# Patient Record
Sex: FEMALE | Race: WHITE | Employment: OTHER | URBAN - METROPOLITAN AREA
[De-identification: names, ages, dates, MRNs, and addresses within clinical notes are randomized per-mention and may not be internally consistent; named-entity substitution may affect disease eponyms.]

---

## 2017-01-19 ENCOUNTER — HOSPITAL ENCOUNTER (OUTPATIENT)
Dept: MAMMOGRAPHY | Age: 77
Discharge: HOME | End: 2017-01-19
Attending: INTERNAL MEDICINE
Payer: MEDICARE

## 2017-01-19 DIAGNOSIS — Z12.31 VISIT FOR SCREENING MAMMOGRAM: ICD-10-CM

## 2017-01-19 PROCEDURE — 77067 SCR MAMMO BI INCL CAD: CPT

## 2017-04-03 ENCOUNTER — HOSPITAL ENCOUNTER (OUTPATIENT)
Dept: LAB | Age: 77
Discharge: HOME | End: 2017-04-03
Payer: MEDICARE

## 2017-04-03 DIAGNOSIS — N27.0 UNILATERAL SMALL KIDNEY: ICD-10-CM

## 2017-04-03 DIAGNOSIS — E21.1 HYPERPARATHYROIDISM DUE TO 1,25(0H)2D3 (HCC): ICD-10-CM

## 2017-04-03 DIAGNOSIS — N18.4 CHRONIC KIDNEY DISEASE, STAGE IV (SEVERE) (HCC): ICD-10-CM

## 2017-04-03 DIAGNOSIS — N28.89 VASCULAR DISORDERS OF KIDNEY: ICD-10-CM

## 2017-04-03 DIAGNOSIS — I10 ESSENTIAL HYPERTENSION, MALIGNANT: ICD-10-CM

## 2017-04-03 DIAGNOSIS — E78.5 OTHER AND UNSPECIFIED HYPERLIPIDEMIA: ICD-10-CM

## 2017-04-03 LAB
ALBUMIN SERPL BCP-MCNC: 4.4 G/DL (ref 3.4–5)
ANION GAP BLD CALC-SCNC: 8 MMOL/L (ref 3–18)
BUN SERPL-MCNC: 20 MG/DL (ref 7–18)
BUN/CREAT SERPL: 12 (ref 12–20)
CALCIUM SERPL-MCNC: 10 MG/DL (ref 8.5–10.1)
CALCIUM SERPL-MCNC: 9.8 MG/DL (ref 8.5–10.1)
CHLORIDE SERPL-SCNC: 106 MMOL/L (ref 100–108)
CO2 SERPL-SCNC: 27 MMOL/L (ref 21–32)
CREAT SERPL-MCNC: 1.73 MG/DL (ref 0.6–1.3)
CREAT UR-MCNC: 152 MG/DL (ref 30–125)
GLUCOSE SERPL-MCNC: 96 MG/DL (ref 74–99)
MICROALBUMIN UR-MCNC: 9.27 MG/DL (ref 0–3)
MICROALBUMIN/CREAT UR-RTO: 61 MG/G (ref 0–30)
PHOSPHATE SERPL-MCNC: 3.5 MG/DL (ref 2.5–4.9)
POTASSIUM SERPL-SCNC: 5 MMOL/L (ref 3.5–5.5)
PTH-INTACT SERPL-MCNC: 103 PG/ML (ref 14–72)
SODIUM SERPL-SCNC: 141 MMOL/L (ref 136–145)

## 2017-04-03 PROCEDURE — 83970 ASSAY OF PARATHORMONE: CPT | Performed by: INTERNAL MEDICINE

## 2017-04-03 PROCEDURE — 80069 RENAL FUNCTION PANEL: CPT | Performed by: INTERNAL MEDICINE

## 2017-04-03 PROCEDURE — 36415 COLL VENOUS BLD VENIPUNCTURE: CPT | Performed by: INTERNAL MEDICINE

## 2017-04-03 PROCEDURE — 82043 UR ALBUMIN QUANTITATIVE: CPT | Performed by: INTERNAL MEDICINE

## 2017-04-17 ENCOUNTER — HOSPITAL ENCOUNTER (OUTPATIENT)
Dept: CT IMAGING | Age: 77
Discharge: HOME | End: 2017-04-17
Attending: UROLOGY
Payer: MEDICARE

## 2017-04-17 ENCOUNTER — HOSPITAL ENCOUNTER (OUTPATIENT)
Dept: LAB | Age: 77
Discharge: HOME | End: 2017-04-17
Payer: MEDICARE

## 2017-04-17 ENCOUNTER — HOSPITAL ENCOUNTER (OUTPATIENT)
Dept: GENERAL RADIOLOGY | Age: 77
Discharge: HOME | End: 2017-04-17
Attending: UROLOGY
Payer: MEDICARE

## 2017-04-17 DIAGNOSIS — N28.89 RENAL MASS: ICD-10-CM

## 2017-04-17 DIAGNOSIS — C64.1 CANCER OF KIDNEY, RIGHT (HCC): ICD-10-CM

## 2017-04-17 DIAGNOSIS — N26.1 RIGHT RENAL ATROPHY: ICD-10-CM

## 2017-04-17 LAB
ANION GAP BLD CALC-SCNC: 14 MMOL/L (ref 3–18)
BUN SERPL-MCNC: 23 MG/DL (ref 7–18)
BUN/CREAT SERPL: 13 (ref 12–20)
CALCIUM SERPL-MCNC: 9.5 MG/DL (ref 8.5–10.1)
CHLORIDE SERPL-SCNC: 106 MMOL/L (ref 100–108)
CO2 SERPL-SCNC: 25 MMOL/L (ref 21–32)
CREAT SERPL-MCNC: 1.84 MG/DL (ref 0.6–1.3)
ERYTHROCYTE [DISTWIDTH] IN BLOOD BY AUTOMATED COUNT: 12.5 % (ref 11.6–14.5)
GLUCOSE SERPL-MCNC: 98 MG/DL (ref 74–99)
HCT VFR BLD AUTO: 36.3 % (ref 35–45)
HGB BLD-MCNC: 11.7 G/DL (ref 12–16)
MCH RBC QN AUTO: 33.1 PG (ref 24–34)
MCHC RBC AUTO-ENTMCNC: 32.2 G/DL (ref 31–37)
MCV RBC AUTO: 102.8 FL (ref 74–97)
PLATELET # BLD AUTO: 193 K/UL (ref 135–420)
PMV BLD AUTO: 11.4 FL (ref 9.2–11.8)
POTASSIUM SERPL-SCNC: 4.7 MMOL/L (ref 3.5–5.5)
RBC # BLD AUTO: 3.53 M/UL (ref 4.2–5.3)
SODIUM SERPL-SCNC: 145 MMOL/L (ref 136–145)
WBC # BLD AUTO: 6.9 K/UL (ref 4.6–13.2)

## 2017-04-17 PROCEDURE — 71020 XR CHEST PA LAT: CPT

## 2017-04-17 PROCEDURE — 74176 CT ABD & PELVIS W/O CONTRAST: CPT

## 2018-02-22 ENCOUNTER — HOSPITAL ENCOUNTER (OUTPATIENT)
Dept: MAMMOGRAPHY | Age: 78
Discharge: HOME | End: 2018-02-22
Attending: INTERNAL MEDICINE
Payer: MEDICARE

## 2018-02-22 DIAGNOSIS — Z12.31 VISIT FOR SCREENING MAMMOGRAM: ICD-10-CM

## 2018-02-22 PROCEDURE — 77063 BREAST TOMOSYNTHESIS BI: CPT

## 2018-03-07 ENCOUNTER — APPOINTMENT (OUTPATIENT)
Dept: GENERAL RADIOLOGY | Age: 78
DRG: 312 | End: 2018-03-07
Attending: PHYSICIAN ASSISTANT
Payer: MEDICARE

## 2018-03-07 ENCOUNTER — HOSPITAL ENCOUNTER (INPATIENT)
Age: 78
LOS: 1 days | Discharge: HOME HEALTH | DRG: 312 | End: 2018-03-09
Attending: EMERGENCY MEDICINE | Admitting: INTERNAL MEDICINE
Payer: MEDICARE

## 2018-03-07 ENCOUNTER — APPOINTMENT (OUTPATIENT)
Dept: CT IMAGING | Age: 78
DRG: 312 | End: 2018-03-07
Attending: PHYSICIAN ASSISTANT
Payer: MEDICARE

## 2018-03-07 DIAGNOSIS — S00.03XA CONTUSION OF SCALP, INITIAL ENCOUNTER: ICD-10-CM
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
## 2018-03-07 DIAGNOSIS — Z23 IMMUNIZATION, TETANUS-DIPHTHERIA: ICD-10-CM

## 2018-03-07 DIAGNOSIS — R00.1 BRADYCARDIA WITH 41-50 BEATS PER MINUTE: ICD-10-CM

## 2018-03-07 DIAGNOSIS — R55 SYNCOPE AND COLLAPSE: Primary | ICD-10-CM

## 2018-03-07 DIAGNOSIS — S41.112A SKIN TEAR OF LEFT UPPER ARM WITHOUT COMPLICATION, INITIAL ENCOUNTER: ICD-10-CM

## 2018-03-07 DIAGNOSIS — S01.01XA LACERATION OF SCALP, INITIAL ENCOUNTER: ICD-10-CM

## 2018-03-07 PROBLEM — R10.30 GROIN PAIN: Status: ACTIVE | Noted: 2018-03-07

## 2018-03-07 PROBLEM — M79.7 FIBROMYALGIA: Status: ACTIVE | Noted: 2018-03-07

## 2018-03-07 PROBLEM — M19.90 ARTHRITIS: Status: ACTIVE | Noted: 2018-03-07

## 2018-03-07 PROBLEM — C80.1 CANCER (HCC): Status: ACTIVE | Noted: 2018-03-07

## 2018-03-07 PROBLEM — R11.2 NAUSEA & VOMITING: Status: ACTIVE | Noted: 2018-03-07

## 2018-03-07 PROBLEM — M70.60 BURSITIS, TROCHANTERIC: Status: ACTIVE | Noted: 2018-03-07

## 2018-03-07 PROBLEM — M54.9 BACK PAIN: Status: ACTIVE | Noted: 2018-03-07

## 2018-03-07 PROBLEM — M53.3 SI (SACROILIAC) JOINT DYSFUNCTION: Status: ACTIVE | Noted: 2018-03-07

## 2018-03-07 PROBLEM — E78.5 HYPERLIPEMIA: Status: ACTIVE | Noted: 2018-03-07

## 2018-03-07 LAB
ALBUMIN SERPL-MCNC: 4 G/DL (ref 3.4–5)
ALBUMIN/GLOB SERPL: 1.2 {RATIO} (ref 0.8–1.7)
ALP SERPL-CCNC: 103 U/L (ref 45–117)
ALT SERPL-CCNC: 19 U/L (ref 13–56)
ANION GAP SERPL CALC-SCNC: 8 MMOL/L (ref 3–18)
AST SERPL-CCNC: 26 U/L (ref 15–37)
ATRIAL RATE: 46 BPM
BASOPHILS # BLD: 0 K/UL (ref 0–0.1)
BASOPHILS NFR BLD: 0 % (ref 0–2)
BILIRUB SERPL-MCNC: 0.5 MG/DL (ref 0.2–1)
BUN SERPL-MCNC: 18 MG/DL (ref 7–18)
BUN/CREAT SERPL: 11 (ref 12–20)
CALCIUM SERPL-MCNC: 9.6 MG/DL (ref 8.5–10.1)
CALCULATED P AXIS, ECG09: 50 DEGREES
CALCULATED R AXIS, ECG10: 27 DEGREES
CALCULATED T AXIS, ECG11: 58 DEGREES
CHLORIDE SERPL-SCNC: 108 MMOL/L (ref 100–108)
CHOLEST SERPL-MCNC: 132 MG/DL
CK MB CFR SERPL CALC: 1.4 % (ref 0–4)
CK MB CFR SERPL CALC: 1.6 % (ref 0–4)
CK MB SERPL-MCNC: 1.6 NG/ML (ref 5–25)
CK MB SERPL-MCNC: 2.2 NG/ML (ref 5–25)
CK SERPL-CCNC: 112 U/L (ref 26–192)
CK SERPL-CCNC: 136 U/L (ref 26–192)
CO2 SERPL-SCNC: 24 MMOL/L (ref 21–32)
CREAT SERPL-MCNC: 1.62 MG/DL (ref 0.6–1.3)
DIAGNOSIS, 93000: NORMAL
DIFFERENTIAL METHOD BLD: ABNORMAL
EOSINOPHIL # BLD: 0.1 K/UL (ref 0–0.4)
EOSINOPHIL NFR BLD: 1 % (ref 0–5)
ERYTHROCYTE [DISTWIDTH] IN BLOOD BY AUTOMATED COUNT: 12.6 % (ref 11.6–14.5)
GLOBULIN SER CALC-MCNC: 3.4 G/DL (ref 2–4)
GLUCOSE SERPL-MCNC: 114 MG/DL (ref 74–99)
HCT VFR BLD AUTO: 36.6 % (ref 35–45)
HDLC SERPL-MCNC: 55 MG/DL (ref 40–60)
HDLC SERPL: 2.4 {RATIO} (ref 0–5)
HGB BLD-MCNC: 12 G/DL (ref 12–16)
LDLC SERPL CALC-MCNC: 62.6 MG/DL (ref 0–100)
LIPID PROFILE,FLP: NORMAL
LYMPHOCYTES # BLD: 1.6 K/UL (ref 0.9–3.6)
LYMPHOCYTES NFR BLD: 18 % (ref 21–52)
MCH RBC QN AUTO: 33 PG (ref 24–34)
MCHC RBC AUTO-ENTMCNC: 32.8 G/DL (ref 31–37)
MCV RBC AUTO: 100.5 FL (ref 74–97)
MONOCYTES # BLD: 0.5 K/UL (ref 0.05–1.2)
MONOCYTES NFR BLD: 5 % (ref 3–10)
NEUTS SEG # BLD: 6.5 K/UL (ref 1.8–8)
NEUTS SEG NFR BLD: 76 % (ref 40–73)
P-R INTERVAL, ECG05: 142 MS
PLATELET # BLD AUTO: 186 K/UL (ref 135–420)
PMV BLD AUTO: 10.7 FL (ref 9.2–11.8)
POTASSIUM SERPL-SCNC: 4.8 MMOL/L (ref 3.5–5.5)
PROT SERPL-MCNC: 7.4 G/DL (ref 6.4–8.2)
Q-T INTERVAL, ECG07: 444 MS
QRS DURATION, ECG06: 94 MS
QTC CALCULATION (BEZET), ECG08: 388 MS
RBC # BLD AUTO: 3.64 M/UL (ref 4.2–5.3)
SODIUM SERPL-SCNC: 140 MMOL/L (ref 136–145)
TRIGL SERPL-MCNC: 72 MG/DL (ref ?–150)
TROPONIN I SERPL-MCNC: <0.02 NG/ML (ref 0–0.04)
TROPONIN I SERPL-MCNC: <0.02 NG/ML (ref 0–0.04)
VENTRICULAR RATE, ECG03: 46 BPM
VLDLC SERPL CALC-MCNC: 14.4 MG/DL
WBC # BLD AUTO: 8.6 K/UL (ref 4.6–13.2)

## 2018-03-07 PROCEDURE — 77030008460 HC STPLR SKN PRECIS 3M -A

## 2018-03-07 PROCEDURE — 93306 TTE W/DOPPLER COMPLETE: CPT

## 2018-03-07 PROCEDURE — 74011250637 HC RX REV CODE- 250/637: Performed by: NURSE PRACTITIONER

## 2018-03-07 PROCEDURE — 90715 TDAP VACCINE 7 YRS/> IM: CPT | Performed by: PHYSICIAN ASSISTANT

## 2018-03-07 PROCEDURE — 80061 LIPID PANEL: CPT | Performed by: NURSE PRACTITIONER

## 2018-03-07 PROCEDURE — 74011250637 HC RX REV CODE- 250/637: Performed by: INTERNAL MEDICINE

## 2018-03-07 PROCEDURE — 75810000293 HC SIMP/SUPERF WND  RPR

## 2018-03-07 PROCEDURE — 0HQ0XZZ REPAIR SCALP SKIN, EXTERNAL APPROACH: ICD-10-PCS | Performed by: PHYSICIAN ASSISTANT

## 2018-03-07 PROCEDURE — 70450 CT HEAD/BRAIN W/O DYE: CPT

## 2018-03-07 PROCEDURE — 74011250637 HC RX REV CODE- 250/637: Performed by: PHYSICIAN ASSISTANT

## 2018-03-07 PROCEDURE — 96372 THER/PROPH/DIAG INJ SC/IM: CPT

## 2018-03-07 PROCEDURE — 99285 EMERGENCY DEPT VISIT HI MDM: CPT

## 2018-03-07 PROCEDURE — 74011250636 HC RX REV CODE- 250/636: Performed by: INTERNAL MEDICINE

## 2018-03-07 PROCEDURE — 85025 COMPLETE CBC W/AUTO DIFF WBC: CPT | Performed by: EMERGENCY MEDICINE

## 2018-03-07 PROCEDURE — 90471 IMMUNIZATION ADMIN: CPT

## 2018-03-07 PROCEDURE — 77030038269 HC DRN EXT URIN PURWCK BARD -A

## 2018-03-07 PROCEDURE — 93005 ELECTROCARDIOGRAM TRACING: CPT

## 2018-03-07 PROCEDURE — 96360 HYDRATION IV INFUSION INIT: CPT

## 2018-03-07 PROCEDURE — 96361 HYDRATE IV INFUSION ADD-ON: CPT

## 2018-03-07 PROCEDURE — 99218 HC RM OBSERVATION: CPT

## 2018-03-07 PROCEDURE — 80053 COMPREHEN METABOLIC PANEL: CPT | Performed by: EMERGENCY MEDICINE

## 2018-03-07 PROCEDURE — 82550 ASSAY OF CK (CPK): CPT | Performed by: PHYSICIAN ASSISTANT

## 2018-03-07 PROCEDURE — 65390000012 HC CONDITION CODE 44 OBSERVATION

## 2018-03-07 PROCEDURE — 74011250636 HC RX REV CODE- 250/636: Performed by: NURSE PRACTITIONER

## 2018-03-07 PROCEDURE — 77030018836 HC SOL IRR NACL ICUM -A

## 2018-03-07 PROCEDURE — 71045 X-RAY EXAM CHEST 1 VIEW: CPT

## 2018-03-07 PROCEDURE — 74011250636 HC RX REV CODE- 250/636: Performed by: PHYSICIAN ASSISTANT

## 2018-03-07 RX ORDER — ENOXAPARIN SODIUM 100 MG/ML
40 INJECTION SUBCUTANEOUS EVERY 24 HOURS
Status: DISCONTINUED | OUTPATIENT
Start: 2018-03-07 | End: 2018-03-07

## 2018-03-07 RX ORDER — ENOXAPARIN SODIUM 100 MG/ML
30 INJECTION SUBCUTANEOUS EVERY 24 HOURS
Status: DISCONTINUED | OUTPATIENT
Start: 2018-03-07 | End: 2018-03-09 | Stop reason: HOSPADM

## 2018-03-07 RX ORDER — SIMVASTATIN 20 MG/1
20 TABLET, FILM COATED ORAL
Status: DISCONTINUED | OUTPATIENT
Start: 2018-03-07 | End: 2018-03-09 | Stop reason: HOSPADM

## 2018-03-07 RX ORDER — SODIUM CHLORIDE 0.9 % (FLUSH) 0.9 %
5-10 SYRINGE (ML) INJECTION AS NEEDED
Status: DISCONTINUED | OUTPATIENT
Start: 2018-03-07 | End: 2018-03-09 | Stop reason: HOSPADM

## 2018-03-07 RX ORDER — TRAZODONE HYDROCHLORIDE 50 MG/1
50 TABLET ORAL
Status: DISCONTINUED | OUTPATIENT
Start: 2018-03-07 | End: 2018-03-09 | Stop reason: HOSPADM

## 2018-03-07 RX ORDER — ACETAMINOPHEN 325 MG/1
650 TABLET ORAL
Status: COMPLETED | OUTPATIENT
Start: 2018-03-07 | End: 2018-03-07

## 2018-03-07 RX ORDER — SODIUM CHLORIDE 9 MG/ML
75 INJECTION, SOLUTION INTRAVENOUS CONTINUOUS
Status: DISCONTINUED | OUTPATIENT
Start: 2018-03-07 | End: 2018-03-09

## 2018-03-07 RX ORDER — MELATONIN
1000 DAILY
Status: DISCONTINUED | OUTPATIENT
Start: 2018-03-08 | End: 2018-03-09 | Stop reason: HOSPADM

## 2018-03-07 RX ORDER — GUAIFENESIN 100 MG/5ML
81 LIQUID (ML) ORAL DAILY
Status: DISCONTINUED | OUTPATIENT
Start: 2018-03-08 | End: 2018-03-09 | Stop reason: HOSPADM

## 2018-03-07 RX ORDER — ACETAMINOPHEN 325 MG/1
650 TABLET ORAL
Status: DISCONTINUED | OUTPATIENT
Start: 2018-03-07 | End: 2018-03-09 | Stop reason: HOSPADM

## 2018-03-07 RX ORDER — LOVASTATIN 20 MG/1
20 TABLET ORAL
Status: DISCONTINUED | OUTPATIENT
Start: 2018-03-07 | End: 2018-03-07

## 2018-03-07 RX ORDER — AMLODIPINE BESYLATE 5 MG/1
2.5 TABLET ORAL DAILY
Status: DISCONTINUED | OUTPATIENT
Start: 2018-03-08 | End: 2018-03-09

## 2018-03-07 RX ORDER — SODIUM CHLORIDE 0.9 % (FLUSH) 0.9 %
5-10 SYRINGE (ML) INJECTION EVERY 8 HOURS
Status: DISCONTINUED | OUTPATIENT
Start: 2018-03-07 | End: 2018-03-09 | Stop reason: HOSPADM

## 2018-03-07 RX ORDER — MONTELUKAST SODIUM 10 MG/1
10 TABLET ORAL
Status: DISCONTINUED | OUTPATIENT
Start: 2018-03-07 | End: 2018-03-09 | Stop reason: HOSPADM

## 2018-03-07 RX ADMIN — SIMVASTATIN 20 MG: 20 TABLET, FILM COATED ORAL at 21:26

## 2018-03-07 RX ADMIN — ACETAMINOPHEN 650 MG: 325 TABLET ORAL at 16:14

## 2018-03-07 RX ADMIN — SODIUM CHLORIDE 75 ML/HR: 900 INJECTION, SOLUTION INTRAVENOUS at 11:56

## 2018-03-07 RX ADMIN — ACETAMINOPHEN 650 MG: 325 TABLET ORAL at 21:29

## 2018-03-07 RX ADMIN — TETANUS TOXOID, REDUCED DIPHTHERIA TOXOID AND ACELLULAR PERTUSSIS VACCINE, ADSORBED 0.5 ML: 5; 2.5; 8; 8; 2.5 SUSPENSION INTRAMUSCULAR at 10:20

## 2018-03-07 RX ADMIN — ENOXAPARIN SODIUM 30 MG: 30 INJECTION SUBCUTANEOUS at 16:14

## 2018-03-07 RX ADMIN — Medication 10 ML: at 21:26

## 2018-03-07 RX ADMIN — SODIUM CHLORIDE 1000 ML: 900 INJECTION, SOLUTION INTRAVENOUS at 11:56

## 2018-03-07 RX ADMIN — MONTELUKAST SODIUM 10 MG: 10 TABLET, COATED ORAL at 21:26

## 2018-03-07 RX ADMIN — ACETAMINOPHEN 650 MG: 325 TABLET, FILM COATED ORAL at 11:56

## 2018-03-07 RX ADMIN — TRAZODONE HYDROCHLORIDE 50 MG: 50 TABLET ORAL at 21:26

## 2018-03-07 NOTE — H&P
Hospitalist Admission History and Physical    NAME:  Hailey Coleman   :   1940   MRN:   [id number]     PCP:  Jasmyn Francis MD  Date/Time:  3/7/2018 1:48 PM  Subjective:   CHIEF COMPLAINT:  Syncope and collapse. HISTORY OF PRESENT ILLNESS:     Kelly Gomez is a 65 y.o.  female with h/o HTN came in to the hospital after syncope and collapse at home. Pt was felt to be nauseated and then got light headed and passed out at home and hit her head. She had no chest pain no sob no abd pain. She was seen in the ED and was orthostatic and hr was in the mid 40's. Cards was consulted. And plan to admit under observation.         Past Medical History:   Diagnosis Date    Arthritis     Back pain     Back problem     Bursitis, trochanteric     Cancer ([address]     left breast cancer-left mastectomy    Fibromyalgia     Groin pain     Hyperlipemia     Hypertension 2010    Nausea & vomiting     Numbness 2008    Left side    Renal mass, right 14    Sciatica     SI (sacroiliac) joint dysfunction     Small kidney     Atrophic right kidney and densely calcified left renal artery        Past Surgical History:   Procedure Laterality Date    HX BACK SURGERY      lumbar sacral area    HX CATARACT REMOVAL Right 09    w/ lens implants    HX CATARACT REMOVAL Left 09    w/ lens implants    HX GI  10/85    Hemorrhoid surgery    HX GI      colonoscopy    HX GYN      hysterectomy    HX HEENT  09    Tube in right ear    HX MASTECTOMY Left     HX NEPHRECTOMY Right 10/9/15    [facility]    HX ORTHOPAEDIC Right 2003    carpel tunnel surgery    HX ORTHOPAEDIC Left 2004    carpel tunnel surgery       Social History   Substance Use Topics    Smoking status: Never Smoker    Smokeless tobacco: Never Used      Comment: no    Alcohol use No        Family History   Problem Relation Age of Onset    Hypertension Other      parent and sibling    Stroke Other     Heart Disease Other 48     sibling    Cancer Sister      Liver    Cancer Sister      Liver    Breast Cancer Maternal Aunt     Breast Cancer Paternal Aunt         Allergies   Allergen Reactions    Darvocet A500 [Propoxyphene N-Acetaminophen] Nausea Only    Iodine Other (comments)    Orudis [Ketoprofen] Nausea Only    Other Medication Nausea Only     oridus    Vicodin [Hydrocodone-Acetaminophen] Nausea Only        Prior to Admission Medications   Prescriptions Last Dose Informant Patient Reported? Taking? Cholecalciferol, Vitamin D3, (VITAMIN D3) 1,000 unit cap Not Taking at Unknown time  Yes No   Sig: Take 2 Tabs by mouth daily. acetaminophen (TYLENOL) 325 mg tablet 3/6/2018 at pm  Yes Yes   Sig: Take  by mouth every four (4) hours as needed for Pain. amLODIPine (NORVASC) 5 mg tablet 3/6/2018 at Unknown time  Yes Yes   Sig: Take 5 mg by mouth daily. loratadine (CLARITIN) 10 mg tablet 2/7/2018 at Unknown time  Yes Yes   Sig: Take 10 mg by mouth.   lovastatin (MEVACOR) 20 mg tablet 3/6/2018 at pm Self Yes Yes   Sig: Take 20 mg by mouth nightly. Indications: takes 1/2 tab   metoprolol succinate (TOPROL-XL) 50 mg XL tablet 3/6/2018 at pm  Yes Yes   Sig: Take 50 mg by mouth two (2) times a day. montelukast (SINGULAIR) 10 mg tablet 3/6/2018 at pm  Yes Yes   Sig: Take 10 mg by mouth nightly. traZODone (DESYREL) 50 mg tablet 3/6/2018 at pm Self Yes Yes   Sig: Take 50 mg by mouth nightly. Facility-Administered Medications: None       REVIEW OF SYSTEMS:     [] Unable to obtain  ROS due to  []mental status change  []sedated   []intubated   [x]Total of 12 systems reviewed as follows:  Constitutional:  negative fever, negative chills, negative weight loss  Eyes:   negative visual changes  ENT:   negative sore throat, tongue or lip swelling  Respiratory:  negative cough, negative dyspnea  Cards:   No chest pain. GI:   Some nausea resolved.  No chest pain.  Genitourinary: negative for frequency, dysuria  Integument:  negative for rash and pruritus  Hematologic:  negative for easy bruising and gum/nose bleeding  Musculoskel: negative for myalgias,  back pain and muscle weakness  Neurological:  negative for headaches, dizziness, vertigo  Behavl/Psych:  negative for feelings of anxiety, depression       Objective:   VITALS:    Visit Vitals    /64 (BP 1 Location: Left arm, BP Patient Position: At rest)    Pulse (!) 52    Temp 98.5 °F (36.9 °C)    Resp 16    Ht 5' 3\" (1.6 m)    Wt 65.8 kg (145 lb)    SpO2 95%    Breastfeeding No    BMI 25.69 kg/m2     Temp (24hrs), Av.7 °F (36.5 °C), Min:96.9 °F (36.1 °C), Max:98.5 °F (36.9 °C)      PHYSICAL EXAM:   General:    Alert, cooperative, no distress, appears stated age. Head:   Normocephalic, without obvious abnormality, atraumatic. Eyes:   Conjunctivae clear, anicteric sclerae. Pupils are equal  Nose:  Nares normal. No drainage or sinus tenderness. Throat:    Lips, mucosa, and tongue normal.  No Thrush  Neck:  Supple, symmetrical,  no adenopathy, thyroid: non tender    no carotid bruit and no JVD. Back:    Symmetric,  No CVA tenderness. Lungs:   Clear to auscultation bilaterally. No Wheezing or Rhonchi. No rales. Chest wall:  No tenderness or deformity. No Accessory muscle use. Heart:   Regular rate and rhythm,  no murmur, rub or gallop. Abdomen:   Soft, non-tender. Not distended. Bowel sounds normal. No masses  Extremities: Extremities normal, atraumatic, No cyanosis. No edema. No clubbing  Skin:     Texture, turgor normal. No rashes or lesions. Psych:  Good insight. Not depressed. Not anxious or agitated. Neurologic: EOMs intact. No facial asymmetry. No aphasia or slurred speech. Normal   strength, Alert and oriented X 3.        LAB DATA REVIEWED:    No components found for: [place]  Recent Labs      18   0609   NA  140   K  4.8   CL  108   CO2  24   BUN  18   CREA  1.62*   GLU 114*   CA  9.6   ALB  4.0   WBC  8.6   HGB  12.0   HCT  36.6   PLT  186         IMAGING RESULTS:   []  I have personally reviewed the actual   []CXR  []CT scan    CXR:  CT :    Assessment/Plan:       Syncope and collapse (3/7/2018) due to orthostatic hypotension;  with sinus Bradycardia due to high dose BB;  Start IV fluids. Hold BB for now watch for rebound tachycardia. Check echo.   Dc in am.       ___________________________________________________  PLAN:    Risk of deterioration:  [x]Low    []Moderate  []High      Prophylaxis:  [x]Lovenox  []Coumadin  []Hep SQ  []SCDs  []H2B/PPI    Disposition:  [x]Home w/ Family   []HH PT,OT,RN   []SNF/LTC   []SAH/Rehab    Discussed Code Status:    [x]Full Code      []DNR     ___________________________________________________    Care Plan discussed with:    [x]Patient   [x]Family    []ED Care Manager  []ED Doc   [x]Specialist :    Total Time Coordinating Admission:      minutes    []Total Critical Care Time:     ___________________________________________________  Admitting Physician: Shayna Birch MD

## 2018-03-07 NOTE — CONSULTS
[facility] - Consult Note    Date of  Admission: 3/7/2018  5:46 AM     Primary Care Physician:  Catarino Dunlap MD     Plan:     1. Syncope- Possible  related to orthostatic hypotension- will continue monitoring for any recurrence. Monitor in telemetry for any tachy or yareli arrhthymia. Check orthostatics. Added ivf 75 cc/hr. Ordered echo to assess, lvf, rvf or any wma. 2. Bradycardia- with HR high 40's. would recommend to hold bb for now. Avoid CCB, digoxin or clonidine. No need at this time for PPM  3. Hypertension- elevated reduce Norvasc to 2.5 mg daily . Ordered compression stockings   4. Heart murmur- follow echo report   5. Hx breast cancer- on remission  6. Hx of Colon Cancer- on remission        Assessment:     Hospital Problems  Date Reviewed: 5/24/2017          Codes Class Noted POA    Arthritis ICD-10-CM: M19.90  ICD-9-CM: 716.90  3/7/2018 Unknown        Back pain ICD-10-CM: M54.9  ICD-9-CM: 724.5  3/7/2018 Unknown        Cancer ([facility] [address] ICD-10-CM: C80.1  ICD-9-CM: 199.1  3/7/2018 Unknown    Overview Signed 3/7/2018 10:16 AM by Edy Elam NP     left breast cancer-left mastectomy             SI (sacroiliac) joint dysfunction ICD-10-CM: M53.3  ICD-9-CM: 724.6  3/7/2018 Unknown        Bursitis, trochanteric ICD-10-CM: M70.60  ICD-9-CM: 726.5  3/7/2018 Unknown        Hyperlipemia ICD-10-CM: E78.5  ICD-9-CM: 272.4  3/7/2018 Unknown        Fibromyalgia ICD-10-CM: M79.7  ICD-9-CM: 729.1  3/7/2018 Unknown        Nausea & vomiting ICD-10-CM: R11.2  ICD-9-CM: 787.01  3/7/2018 Unknown        Groin pain ICD-10-CM: R10.30  ICD-9-CM: 789.09  3/7/2018 Unknown                   History of Present Illness: This is a 65 y.o. female admitted for syncope. Patient with PMHx of hypertension, hyperlipidemia, colon cancer, breast cancer and fibromyalgia.  Patient presented to the ED c/o syncopal episode patient reports that approx 0230 she  fell in bathroom and went straight down and lost consciousness. She reports getting out of bed nauseated. When she walked to the bathroom she passed out, she c/o mild dizziness prior her syncopal episode. She denies any previous syncopal  Episodes. She reports taking sleeping pills trazodone the night before. She denies any chest pain pressure or any palpitations   No recent CVA. Past Medical History:     Past Medical History:   Diagnosis Date    Arthritis     Back pain     Back problem     Bursitis, trochanteric     Cancer ([address]     left breast cancer-left mastectomy    Fibromyalgia     Groin pain     Hyperlipemia     Hypertension 9/13/2010    Nausea & vomiting     Numbness April 2008    Left side    Renal mass, right 6/4/14    Sciatica     SI (sacroiliac) joint dysfunction     Small kidney     Atrophic right kidney and densely calcified left renal artery         Social History:     Social History     Social History    Marital status:      Spouse name: N/A    Number of children: N/A    Years of education: N/A     Social History Main Topics    Smoking status: Never Smoker    Smokeless tobacco: Never Used      Comment: no    Alcohol use No    Drug use: No    Sexual activity: Yes     Partners: Male     Other Topics Concern    Not on file     Social History Narrative        Family History:     Family History   Problem Relation Age of Onset    Hypertension Other      parent and sibling    Stroke Other     Heart Disease Other 48     sibling    Cancer Sister      Liver    Cancer Sister      Liver    Breast Cancer Maternal Aunt     Breast Cancer Paternal Aunt         Medications:      Allergies   Allergen Reactions    Darvocet A500 [Propoxyphene N-Acetaminophen] Nausea Only    Iodine Other (comments)    Orudis [Ketoprofen] Nausea Only    Other Medication Nausea Only     oridus    Vicodin [Hydrocodone-Acetaminophen] Nausea Only        Current Facility-Administered Medications   Medication Dose Route Frequency    sodium chloride 0.9 % bolus infusion 1,000 mL  1,000 mL IntraVENous ONCE    0.9% sodium chloride infusion  75 mL/hr IntraVENous CONTINUOUS    [START ON 3/8/2018] amLODIPine (NORVASC) tablet 2.5 mg  2.5 mg Oral DAILY    simvastatin (ZOCOR) tablet 20 mg  20 mg Oral QHS    [START ON 3/8/2018] aspirin chewable tablet 81 mg  81 mg Oral DAILY     Current Outpatient Prescriptions   Medication Sig    amLODIPine (NORVASC) 5 mg tablet Take 5 mg by mouth daily.  loratadine (CLARITIN) 10 mg tablet Take 10 mg by mouth.  acetaminophen (TYLENOL) 325 mg tablet Take  by mouth every four (4) hours as needed for Pain.  montelukast (SINGULAIR) 10 mg tablet Take 10 mg by mouth nightly.  metoprolol succinate (TOPROL-XL) 50 mg XL tablet Take 50 mg by mouth two (2) times a day.  Cholecalciferol, Vitamin D3, (VITAMIN D3) 1,000 unit cap Take 2 Tabs by mouth daily.  traZODone (DESYREL) 50 mg tablet Take 50 mg by mouth nightly.  lovastatin (MEVACOR) 20 mg tablet Take 20 mg by mouth nightly.     Indications: takes 1/2 tab        Review Of Systems:       Constitutional: No fever, no chills, no weight loss, no night sweats   HEENT: No epistaxis, no nasal drainage, no difficulty in swallowing, no redness in eyes  Respiratory:  negative for cough, sputum, hemoptysis, pleurisy/chest pain, wheezing, dyspnea on exertion or emphysema  Cardiovascular: dizziness and syncope  Gastrointestinal: no abd pain, no vomiting, no diarrhea, no bleeding symptoms  Genitourinary: No urinary symptoms or hematuria  Integument/breast: No ulcers or rashes  Musculoskeletal: back pain   Neurological: No focal weakness, no seizures, no headaches  Behvioral/Psych: No anxiety, no depression       Physical Exam:     Visit Vitals    /73 (BP 1 Location: Right arm, BP Patient Position: At rest)    Pulse (!) 44    Temp 96.9 °F (36.1 °C)    Resp 16    Ht 5' 3\" (1.6 m)    Wt 65.8 kg (145 lb)    SpO2 98%    BMI 25.69 kg/m2     BP Readings from Last 3 Encounters:   03/07/18 159/73   05/24/17 118/76   05/18/16 124/86     Pulse Readings from Last 3 Encounters:   03/07/18 (!) 44   08/20/15 68   08/12/14 66     Wt Readings from Last 3 Encounters:   03/07/18 65.8 kg (145 lb)   05/24/17 63.5 kg (140 lb)   05/18/16 63.5 kg (140 lb)       General:  alert, cooperative, no distress, appears stated age  Skin: Warm and dry, acyanotic, normal color. Head: Normocephalic. Laceration noted occipital area. Eyes: Sclerae anicteric, conjunctivae without injection. Neck:  nontender, no nuchal rigidity, no masses, no stridor, no carotid bruit, no JVD  Lungs:  clear to auscultation bilaterally. Heart:  regular rate and rhythm, S1, S2 normal, systolic murmur: holosystolic 2/6, blowing at lower left sternal border, at apex, radiates to axilla, no click  Abdomen:  abdomen is soft without significant tenderness, masses, organomegaly or guarding  Extremities:  extremities normal, atraumatic, no cyanosis or edema  Neurological: grossly intact. No focal abnormalities, moves all extremities well. Psychiatric Affect: The patient is awake, alert and oriented x3. Kalina Barbone is interactive and appropriate. Data Review:     Recent Results (from the past 48 hour(s))   CBC WITH AUTOMATED DIFF    Collection Time: 03/07/18  6:09 AM   Result Value Ref Range    WBC 8.6 4.6 - 13.2 K/uL    RBC 3.64 (L) 4.20 - 5.30 M/uL    HGB 12.0 12.0 - 16.0 g/dL    HCT 36.6 35.0 - 45.0 %    .5 (H) 74.0 - 97.0 FL    MCH 33.0 24.0 - 34.0 PG    MCHC 32.8 31.0 - 37.0 g/dL    RDW 12.6 11.6 - 14.5 %    PLATELET 081 290 - 474 K/uL    MPV 10.7 9.2 - 11.8 FL    NEUTROPHILS 76 (H) 40 - 73 %    LYMPHOCYTES 18 (L) 21 - 52 %    MONOCYTES 5 3 - 10 %    EOSINOPHILS 1 0 - 5 %    BASOPHILS 0 0 - 2 %    ABS. NEUTROPHILS 6.5 1.8 - 8.0 K/UL    ABS. LYMPHOCYTES 1.6 0.9 - 3.6 K/UL    ABS. MONOCYTES 0.5 0.05 - 1.2 K/UL    ABS. EOSINOPHILS 0.1 0.0 - 0.4 K/UL    ABS.  BASOPHILS 0.0 0.0 - 0.1 K/UL    DF AUTOMATED     METABOLIC PANEL, COMPREHENSIVE    Collection Time: 03/07/18  6:09 AM   Result Value Ref Range    Sodium 140 136 - 145 mmol/L    Potassium 4.8 3.5 - 5.5 mmol/L    Chloride 108 100 - 108 mmol/L    CO2 24 21 - 32 mmol/L    Anion gap 8 3.0 - 18 mmol/L    Glucose 114 (H) 74 - 99 mg/dL    BUN 18 7.0 - 18 MG/DL    Creatinine 1.62 (H) 0.6 - 1.3 MG/DL    BUN/Creatinine ratio 11 (L) 12 - 20      GFR est AA 37 (L) >60 ml/min/1.73m2    GFR est non-AA 31 (L) >60 ml/min/1.73m2    Calcium 9.6 8.5 - 10.1 MG/DL    Bilirubin, total 0.5 0.2 - 1.0 MG/DL    ALT (SGPT) 19 13 - 56 U/L    AST (SGOT) 26 15 - 37 U/L    Alk. phosphatase 103 45 - 117 U/L    Protein, total 7.4 6.4 - 8.2 g/dL    Albumin 4.0 3.4 - 5.0 g/dL    Globulin 3.4 2.0 - 4.0 g/dL    A-G Ratio 1.2 0.8 - 1.7     CARDIAC PANEL,(CK, CKMB & TROPONIN)    Collection Time: 03/07/18  6:09 AM   Result Value Ref Range     26 - 192 U/L    CK - MB 2.2 <3.6 ng/ml    CK-MB Index 1.6 0.0 - 4.0 %    Troponin-I, Qt. <0.02 0.0 - 0.045 NG/ML   EKG, 12 LEAD, INITIAL    Collection Time: 03/07/18  6:11 AM   Result Value Ref Range    Ventricular Rate 46 BPM    Atrial Rate 46 BPM    P-R Interval 142 ms    QRS Duration 94 ms    Q-T Interval 444 ms    QTC Calculation (Bezet) 388 ms    Calculated P Axis 50 degrees    Calculated R Axis 27 degrees    Calculated T Axis 58 degrees    Diagnosis       Sinus bradycardia  RSR' or QR pattern in V1 suggests right ventricular conduction delay  Borderline ECG  When compared with ECG of 24-JUL-2014 12:14,  No significant change was found     CARDIAC PANEL,(CK, CKMB & TROPONIN)    Collection Time: 03/07/18 10:18 AM   Result Value Ref Range     26 - 192 U/L    CK - MB 1.6 <3.6 ng/ml    CK-MB Index 1.4 0.0 - 4.0 %    Troponin-I, Qt. <0.02 0.0 - 0.045 NG/ML       No intake or output data in the 24 hours ending 03/07/18 1121    Cardiographics:     ECG: Sinus bradycardia .  RSR' or QR pattern in V1 suggests right ventricular conduction delay    Echocardiogram: ordered Signed By: Teresita Degroot NP     March 7, 2018

## 2018-03-07 NOTE — PROGRESS NOTES
Complete 2D echocardiogram study was performed on patient. Report to follow. Patient went back to room with armband still in place.  [address] [facility]

## 2018-03-07 NOTE — IP AVS SNAPSHOT
[address] Patient: Alexandra Gomez MRN: [id number] Shannan Amy A check jeremiah indicates which time of day the medication should be taken. My Medications START taking these medications Instructions Each Dose to Equal  
 Morning Noon Evening Bedtime  
 aspirin 81 mg chewable tablet Start taking on:  3/10/2018 Your last dose was: Your next dose is: Take 1 Tab by mouth daily. 81 mg  
    
   
   
   
  
 hydrALAZINE 25 mg tablet Commonly known as:  APRESOLINE Your last dose was: Your next dose is: Take 1 Tab by mouth three (3) times daily. 25 mg CONTINUE taking these medications Instructions Each Dose to Equal  
 Morning Noon Evening Bedtime  
 amLODIPine 5 mg tablet Commonly known as:  Alyx Moe Your last dose was: Your next dose is: Take 5 mg by mouth daily. 5 mg CLARITIN 10 mg tablet Generic drug:  loratadine Your last dose was: Your next dose is: Take 10 mg by mouth. 10 mg  
    
   
   
   
  
 lovastatin 20 mg tablet Commonly known as:  MEVACOR Your last dose was: Your next dose is: Take 20 mg by mouth nightly. Indications: takes 1/2 tab  
 20 mg  
    
   
   
   
  
 SINGULAIR 10 mg tablet Generic drug:  montelukast  
   
Your last dose was: Your next dose is: Take 10 mg by mouth nightly. 10 mg  
    
   
   
   
  
 traZODone 50 mg tablet Commonly known as:  Vinny Ying Your last dose was: Your next dose is: Take 50 mg by mouth nightly. 50 mg  
    
   
   
   
  
 TYLENOL 325 mg tablet Generic drug:  acetaminophen Your last dose was: Your next dose is: Take  by mouth every four (4) hours as needed for Pain. VITAMIN D3 1,000 unit Cap Generic drug:  cholecalciferol Your last dose was: Your next dose is: Take 2 Tabs by mouth daily. 2 Tab  
    
   
   
   
  
  
STOP taking these medications   
 metoprolol succinate 50 mg XL tablet Commonly known as:  TOPROL-XL Where to Get Your Medications Information on where to get these meds will be given to you by the nurse or doctor. ! Ask your nurse or doctor about these medications  
  aspirin 81 mg chewable tablet  
 hydrALAZINE 25 mg tablet

## 2018-03-07 NOTE — PROGRESS NOTES
met with patient, her  Genita Shone, and daughter, Anthony Egan, completed the initial Spiritual Assessment of the patient, and offered Pastoral Care, see flow sheets for interventions. Pastoral support provided. Patient spoke of her I[employer] Second jayne and that it is very important to her well being. Patient does not have any Yazidism/cultural needs that will affect patients preferences in health care. Chart reviewed. Chaplains will continue to follow and will provide pastoral care on an as needed/as requested basis. Denton De Oliveira MDiv.   Board Certified [employer] [phone]

## 2018-03-07 NOTE — CONSULTS
[facility] - Consult Note    Date of  Admission: 3/7/2018  5:46 AM     Primary Care Physician:  Yeny Garnett MD     Plan:     1. Syncope- Possible  related to orthostatic hypotension- will continue monitoring for any recurrence. Monitor in telemetry for any tachy or yareli arrhthymia. Check orthostatics. Added ivf 75 cc/hr. Ordered echo to assess, lvf, rvf or any wma. 2. Bradycardia- with HR high 40's. would recommend to hold bb for now. Avoid CCB, digoxin or clonidine. No need at this time for PPM  3. Hypertension- elevated reduce Norvasc to 2.5 mg daily . Ordered compression stockings   4. Heart murmur- follow echo report   5. Hx breast cancer- on remission  6. Hx of Colon Cancer- on remission   7. Hyperlipidemia- follow lipid panel. Continue statin. D/w pt and family in detail     Assessment:     Hospital Problems  Date Reviewed: 5/24/2017          Codes Class Noted POA    Arthritis ICD-10-CM: M19.90  ICD-9-CM: 716.90  3/7/2018 Unknown        Back pain ICD-10-CM: M54.9  ICD-9-CM: 724.5  3/7/2018 Unknown        Cancer ([facility] [address] ICD-10-CM: C80.1  ICD-9-CM: 199.1  3/7/2018 Unknown    Overview Signed 3/7/2018 10:16 AM by Krystle Armijo NP     left breast cancer-left mastectomy             SI (sacroiliac) joint dysfunction ICD-10-CM: M53.3  ICD-9-CM: 724.6  3/7/2018 Unknown        Bursitis, trochanteric ICD-10-CM: M70.60  ICD-9-CM: 726.5  3/7/2018 Unknown        Hyperlipemia ICD-10-CM: E78.5  ICD-9-CM: 272.4  3/7/2018 Unknown        Fibromyalgia ICD-10-CM: M79.7  ICD-9-CM: 729.1  3/7/2018 Unknown        Nausea & vomiting ICD-10-CM: R11.2  ICD-9-CM: 787.01  3/7/2018 Unknown        Groin pain ICD-10-CM: R10.30  ICD-9-CM: 789.09  3/7/2018 Unknown        Bradycardia ICD-10-CM: R00.1  ICD-9-CM: 427.89  3/7/2018 Unknown        Syncope ICD-10-CM: R55  ICD-9-CM: 780.2  3/7/2018 Unknown                   History of Present Illness: This is a 65 y.o. female admitted for Bradycardia.  This is a 65 y.o. female admitted for syncope. Patient with PMHx of hypertension, hyperlipidemia, colon cancer, breast cancer and fibromyalgia. Patient presented to the ED c/o syncopal episode patient reports that approx 0230 she  fell in bathroom and went straight down and lost consciousness. She reports getting out of bed nauseated. When she walked to the bathroom she passed out, she c/o mild dizziness prior her syncopal episode. She denies any previous syncopal  Episodes. She reports taking sleeping pills trazodone the night before. She denies any chest pain pressure or any palpitations   No recent CVA.        Past Medical History:     Past Medical History:   Diagnosis Date    Arthritis     Back pain     Back problem     Bursitis, trochanteric     Cancer ([facility] [address]     left breast cancer-left mastectomy    Fibromyalgia     Groin pain     Hyperlipemia     Hypertension 9/13/2010    Nausea & vomiting     Numbness April 2008    Left side    Renal mass, right 6/4/14    Sciatica     SI (sacroiliac) joint dysfunction     Small kidney     Atrophic right kidney and densely calcified left renal artery         Social History:     Social History     Social History    Marital status:      Spouse name: N/A    Number of children: N/A    Years of education: N/A     Social History Main Topics    Smoking status: Never Smoker    Smokeless tobacco: Never Used      Comment: no    Alcohol use No    Drug use: No    Sexual activity: Yes     Partners: Male     Other Topics Concern    Not on file     Social History Narrative        Family History:     Family History   Problem Relation Age of Onset    Hypertension Other      parent and sibling    Stroke Other     Heart Disease Other 48     sibling    Cancer Sister      Liver    Cancer Sister      Liver    Breast Cancer Maternal Aunt     Breast Cancer Paternal Aunt         Medications:      Allergies   Allergen Reactions    Darvocet A500 [Propoxyphene N-Acetaminophen] Nausea Only    Iodine Other (comments)    Orudis [Ketoprofen] Nausea Only    Other Medication Nausea Only     oridus    Vicodin [Hydrocodone-Acetaminophen] Nausea Only        Current Facility-Administered Medications   Medication Dose Route Frequency    sodium chloride 0.9 % bolus infusion 1,000 mL  1,000 mL IntraVENous ONCE    0.9% sodium chloride infusion  75 mL/hr IntraVENous CONTINUOUS    [START ON 3/8/2018] amLODIPine (NORVASC) tablet 2.5 mg  2.5 mg Oral DAILY    simvastatin (ZOCOR) tablet 20 mg  20 mg Oral QHS    [START ON 3/8/2018] aspirin chewable tablet 81 mg  81 mg Oral DAILY    acetaminophen (TYLENOL) tablet 650 mg  650 mg Oral NOW     Current Outpatient Prescriptions   Medication Sig    amLODIPine (NORVASC) 5 mg tablet Take 5 mg by mouth daily.  loratadine (CLARITIN) 10 mg tablet Take 10 mg by mouth.  acetaminophen (TYLENOL) 325 mg tablet Take  by mouth every four (4) hours as needed for Pain.  montelukast (SINGULAIR) 10 mg tablet Take 10 mg by mouth nightly.  metoprolol succinate (TOPROL-XL) 50 mg XL tablet Take 50 mg by mouth two (2) times a day.  Cholecalciferol, Vitamin D3, (VITAMIN D3) 1,000 unit cap Take 2 Tabs by mouth daily.  traZODone (DESYREL) 50 mg tablet Take 50 mg by mouth nightly.  lovastatin (MEVACOR) 20 mg tablet Take 20 mg by mouth nightly. Indications: takes 1/2 tab        Review Of Systems:       Constitutional: No fever, no chills, no weight loss, no night sweats   HEENT: No epistaxis, no nasal drainage, no difficulty in swallowing, no redness in eyes  Respiratory: dyspnea on exertion, negative for cough, sputum, hemoptysis, pleurisy/chest pain, wheezing, dyspnea on exertion or emphysema  Cardiovascular: syncope, dizziness. Gastrointestinal: no abd pain, no vomiting, no diarrhea, no bleeding symptoms  Genitourinary: No urinary symptoms or hematuria  Integument/breast: No ulcers or rashes  Musculoskeletal: back pain.   Neurological: No focal weakness, no seizures, no headaches  Behvioral/Psych: No anxiety, no depression     Physical Exam:     Visit Vitals    /73 (BP 1 Location: Right arm, BP Patient Position: At rest)    Pulse (!) 44    Temp 96.9 °F (36.1 °C)    Resp 16    Ht 5' 3\" (1.6 m)    Wt 65.8 kg (145 lb)    SpO2 98%    BMI 25.69 kg/m2     BP Readings from Last 3 Encounters:   03/07/18 159/73   05/24/17 118/76   05/18/16 124/86     Pulse Readings from Last 3 Encounters:   03/07/18 (!) 44   08/20/15 68   08/12/14 66     Wt Readings from Last 3 Encounters:   03/07/18 65.8 kg (145 lb)   05/24/17 63.5 kg (140 lb)   05/18/16 63.5 kg (140 lb)       General:  alert, cooperative, no distress, appears stated age  Skin: Warm and dry, acyanotic, normal color. Head: Normocephalic. Laceration noted occipital area. Eyes: Sclerae anicteric, conjunctivae without injection. Neck:  nontender, no nuchal rigidity, no masses, no stridor, no carotid bruit, no JVD  Lungs:  clear to auscultation bilaterally. Heart:  regular rate and rhythm, no S3 or S4, systolic murmur: holosystolic 3/6, blowing at lower left sternal border, at apex, no click, no rub  Abdomen:  abdomen is soft without significant tenderness, masses, organomegaly or guarding  Extremities:  extremities normal, atraumatic, no cyanosis or edema  Neurological: grossly intact. No focal abnormalities, moves all extremities well. Psychiatric Affect: The patient is awake, alert and oriented x3.  Osmin is interactive and appropriate.      Data Review:     Recent Results (from the past 48 hour(s))   CBC WITH AUTOMATED DIFF    Collection Time: 03/07/18  6:09 AM   Result Value Ref Range    WBC 8.6 4.6 - 13.2 K/uL    RBC 3.64 (L) 4.20 - 5.30 M/uL    HGB 12.0 12.0 - 16.0 g/dL    HCT 36.6 35.0 - 45.0 %    .5 (H) 74.0 - 97.0 FL    MCH 33.0 24.0 - 34.0 PG    MCHC 32.8 31.0 - 37.0 g/dL    RDW 12.6 11.6 - 14.5 %    PLATELET 337 817 - 488 K/uL    MPV 10.7 9.2 - 11.8 FL    NEUTROPHILS 76 (H) 40 - 73 %    LYMPHOCYTES 18 (L) 21 - 52 %    MONOCYTES 5 3 - 10 %    EOSINOPHILS 1 0 - 5 %    BASOPHILS 0 0 - 2 %    ABS. NEUTROPHILS 6.5 1.8 - 8.0 K/UL    ABS. LYMPHOCYTES 1.6 0.9 - 3.6 K/UL    ABS. MONOCYTES 0.5 0.05 - 1.2 K/UL    ABS. EOSINOPHILS 0.1 0.0 - 0.4 K/UL    ABS. BASOPHILS 0.0 0.0 - 0.1 K/UL    DF AUTOMATED     METABOLIC PANEL, COMPREHENSIVE    Collection Time: 03/07/18  6:09 AM   Result Value Ref Range    Sodium 140 136 - 145 mmol/L    Potassium 4.8 3.5 - 5.5 mmol/L    Chloride 108 100 - 108 mmol/L    CO2 24 21 - 32 mmol/L    Anion gap 8 3.0 - 18 mmol/L    Glucose 114 (H) 74 - 99 mg/dL    BUN 18 7.0 - 18 MG/DL    Creatinine 1.62 (H) 0.6 - 1.3 MG/DL    BUN/Creatinine ratio 11 (L) 12 - 20      GFR est AA 37 (L) >60 ml/min/1.73m2    GFR est non-AA 31 (L) >60 ml/min/1.73m2    Calcium 9.6 8.5 - 10.1 MG/DL    Bilirubin, total 0.5 0.2 - 1.0 MG/DL    ALT (SGPT) 19 13 - 56 U/L    AST (SGOT) 26 15 - 37 U/L    Alk.  phosphatase 103 45 - 117 U/L    Protein, total 7.4 6.4 - 8.2 g/dL    Albumin 4.0 3.4 - 5.0 g/dL    Globulin 3.4 2.0 - 4.0 g/dL    A-G Ratio 1.2 0.8 - 1.7     CARDIAC PANEL,(CK, CKMB & TROPONIN)    Collection Time: 03/07/18  6:09 AM   Result Value Ref Range     26 - 192 U/L    CK - MB 2.2 <3.6 ng/ml    CK-MB Index 1.6 0.0 - 4.0 %    Troponin-I, Qt. <0.02 0.0 - 0.045 NG/ML   EKG, 12 LEAD, INITIAL    Collection Time: 03/07/18  6:11 AM   Result Value Ref Range    Ventricular Rate 46 BPM    Atrial Rate 46 BPM    P-R Interval 142 ms    QRS Duration 94 ms    Q-T Interval 444 ms    QTC Calculation (Bezet) 388 ms    Calculated P Axis 50 degrees    Calculated R Axis 27 degrees    Calculated T Axis 58 degrees    Diagnosis       Sinus bradycardia  RSR' or QR pattern in V1 suggests right ventricular conduction delay  Borderline ECG  When compared with ECG of 24-JUL-2014 12:14,  No significant change was found     CARDIAC PANEL,(CK, CKMB & TROPONIN)    Collection Time: 03/07/18 10:18 AM   Result Value Ref Range  26 - 192 U/L    CK - MB 1.6 <3.6 ng/ml    CK-MB Index 1.4 0.0 - 4.0 %    Troponin-I, Qt. <0.02 0.0 - 0.045 NG/ML       No intake or output data in the 24 hours ending 03/07/18 1145    Cardiographics:     ECG: sinus bradycardia    Echocardiogram: ordered      Signed By: Emory Cotto NP     March 7, 2018      Patient seen independently  Discussed the details with NP and patient.  Please see orders & recommendations  Katie Blue MD

## 2018-03-07 NOTE — ED NOTES
TRANSFER - OUT REPORT:    Verbal report given to Chrissy Moyer RN(name) on [employer]  being transferred to (unit) for routine progression of care       Report consisted of patients Situation, Background, Assessment and   Recommendations(SBAR). Information from the following report(s) SBAR, ED Summary, Intake/Output, MAR, Recent Results and Cardiac Rhythm Sinus Ayana Albright was reviewed with the receiving nurse. Opportunity for questions and clarification was provided.       Patient transported with:   Monitor  Registered Nurse

## 2018-03-07 NOTE — ED PROVIDER NOTES
EMERGENCY DEPARTMENT HISTORY AND PHYSICAL EXAM    Date: 3/7/2018  Patient Name: Bj Treviño    History of Presenting Illness     Chief Complaint   Patient presents with    Fall    Head Injury       History Provided By: Patient    Chief Complaint: Syncope with fall, head strike  Duration:  Seconds  Timing:  Acute  Location: Head  Quality: N/A  Severity: N/A  Modifying Factors: None. Associated Symptoms: lacerations to head, left arm. Additional History (Context): Bj Treviño is a 65 y.o. female with hypertension and reports bradycardia due to Metoprolol for HTN who presents after reported syncope. Pt states around 0230 this morning she experienced 15 minutes of nausea followed by syncopal event. Did not have CP, dizziness, diaphoresis prior to syncope. States she hit her head and left arm on cabinet.  reports he heard her fall and when he found her in the bathroom she was alert and oriented. Pt states she takes trazadone at night to sleep but states she didn't go to sleep after taking the medication tonight and attributes syncopal event to ambulating while on the medication. Pt is c/o lacerations to the posterior head and left arm,  drove her to the ED because of continued bleeding of the head. On daily ASA. Pt denies any discomfort at this time. Unsure of last tetanus. Reports chronic rhinorrhea and cough due to allergies, no acute worsening of either.     PCP: Kirkwood Schirmer, MD    Current Facility-Administered Medications   Medication Dose Route Frequency Provider Last Rate Last Dose    sodium chloride 0.9 % bolus infusion 1,000 mL  1,000 mL IntraVENous ONCE Catie Montalvo PA-C        0.9% sodium chloride infusion  75 mL/hr IntraVENous CONTINUOUS Verona Suarez NP        [START ON 3/8/2018] amLODIPine (NORVASC) tablet 2.5 mg  2.5 mg Oral DAILY Verona Suarez NP        simvastatin (ZOCOR) tablet 20 mg  20 mg Oral QHS Verona Suarez NP        [START ON 3/8/2018] aspirin chewable tablet 81 mg  81 mg Oral DAILY Verona Suarez, MARGARITA        acetaminophen (TYLENOL) tablet 650 mg  650 mg Oral NOW Machelle Keller PA-C         Current Outpatient Prescriptions   Medication Sig Dispense Refill    amLODIPine (NORVASC) 5 mg tablet Take 5 mg by mouth daily.  loratadine (CLARITIN) 10 mg tablet Take 10 mg by mouth.  acetaminophen (TYLENOL) 325 mg tablet Take  by mouth every four (4) hours as needed for Pain.  montelukast (SINGULAIR) 10 mg tablet Take 10 mg by mouth nightly.  metoprolol succinate (TOPROL-XL) 50 mg XL tablet Take 50 mg by mouth two (2) times a day.  Cholecalciferol, Vitamin D3, (VITAMIN D3) 1,000 unit cap Take 2 Tabs by mouth daily.  traZODone (DESYREL) 50 mg tablet Take 50 mg by mouth nightly.  lovastatin (MEVACOR) 20 mg tablet Take 20 mg by mouth nightly.     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 Migdalia Pelayo[facility]    HX ORTHOPAEDIC Right 06/24/2003    carpel tunnel surgery    HX ORTHOPAEDIC Left 4/2004    carpel tunnel surgery       Family History:  Family History   Problem Relation Age of Onset    Hypertension Other      parent and sibling    Stroke Other     Heart Disease Other 48     sibling    Cancer Sister      Liver    Cancer Sister      Liver    Breast Cancer Maternal Aunt     Breast Cancer Paternal Aunt        Social History:  Social History   Substance Use Topics    Smoking status: Never Smoker    Smokeless tobacco: Never Used      Comment: no    Alcohol use No       Allergies: Allergies   Allergen Reactions    Darvocet A500 [Propoxyphene N-Acetaminophen] Nausea Only    Iodine Other (comments)    Orudis [Ketoprofen] Nausea Only    Other Medication Nausea Only     oridus    Vicodin [Hydrocodone-Acetaminophen] Nausea Only         Review of Systems   Review of Systems   Constitutional: Negative for chills and fever. HENT: Positive for rhinorrhea. Eyes: Negative for visual disturbance. Respiratory: Positive for cough. Negative for shortness of breath. Cardiovascular: Negative for chest pain and leg swelling. Gastrointestinal: Positive for nausea. Negative for abdominal pain, diarrhea and vomiting. Genitourinary: Negative for dysuria. Musculoskeletal: Negative for neck pain. Skin: Negative for rash. Neurological: Positive for syncope. Negative for facial asymmetry and weakness. All Other Systems Negative  Physical Exam     Vitals:    03/07/18 0630 03/07/18 0645 03/07/18 0715 03/07/18 1021   BP: 151/61 141/61 144/59 159/73   Pulse: (!) 45 (!) 46 (!) 43 (!) 44   Resp: 17 18 16    Temp:       SpO2: 99% 97% 98%    Weight:       Height:         Physical Exam   Constitutional: She is oriented to person, place, and time. She appears well-developed and well-nourished. No distress. HENT:   Head: Normocephalic.    Right Ear: External ear normal.   Left Ear: External ear normal.   Nose: Nose normal.   Mouth/Throat: Oropharynx is clear and moist. No oropharyngeal exudate. 1.5cm linear laceration to the posterior scalp with minimal bleeding noted. Eyes: Conjunctivae and EOM are normal. Pupils are equal, round, and reactive to light. Right eye exhibits no discharge. Left eye exhibits no discharge. No scleral icterus. Neck: Normal range of motion. Neck supple. Cardiovascular: Regular rhythm and normal heart sounds. Bradycardic. Pulmonary/Chest: Effort normal and breath sounds normal. No respiratory distress. She has no wheezes. Abdominal: Soft. Bowel sounds are normal. She exhibits no distension. There is no tenderness. There is no rebound and no guarding. Musculoskeletal:   BUE and BLE strength is equal and symmetric. Neurological: She is alert and oriented to person, place, and time. CN II-XII grossly intact. Normal RICK.  Follows commands and is acting appropriately. Skin: Skin is warm and dry. She is not diaphoretic. LUE: two small skin tears noted to the lateral aspect of the left upper extremity with surrounding ecchymosis. Not actively bleeding. Psychiatric: She has a normal mood and affect. Her behavior is normal.   Vitals reviewed. Diagnostic Study Results     Labs -     Recent Results (from the past 12 hour(s))   CBC WITH AUTOMATED DIFF    Collection Time: 03/07/18  6:09 AM   Result Value Ref Range    WBC 8.6 4.6 - 13.2 K/uL    RBC 3.64 (L) 4.20 - 5.30 M/uL    HGB 12.0 12.0 - 16.0 g/dL    HCT 36.6 35.0 - 45.0 %    .5 (H) 74.0 - 97.0 FL    MCH 33.0 24.0 - 34.0 PG    MCHC 32.8 31.0 - 37.0 g/dL    RDW 12.6 11.6 - 14.5 %    PLATELET 148 835 - 817 K/uL    MPV 10.7 9.2 - 11.8 FL    NEUTROPHILS 76 (H) 40 - 73 %    LYMPHOCYTES 18 (L) 21 - 52 %    MONOCYTES 5 3 - 10 %    EOSINOPHILS 1 0 - 5 %    BASOPHILS 0 0 - 2 %    ABS. NEUTROPHILS 6.5 1.8 - 8.0 K/UL    ABS. LYMPHOCYTES 1.6 0.9 - 3.6 K/UL    ABS. MONOCYTES 0.5 0.05 - 1.2 K/UL    ABS. EOSINOPHILS 0.1 0.0 - 0.4 K/UL    ABS.  BASOPHILS 0.0 0.0 - 0.1 K/UL    DF AUTOMATED METABOLIC PANEL, COMPREHENSIVE    Collection Time: 03/07/18  6:09 AM   Result Value Ref Range    Sodium 140 136 - 145 mmol/L    Potassium 4.8 3.5 - 5.5 mmol/L    Chloride 108 100 - 108 mmol/L    CO2 24 21 - 32 mmol/L    Anion gap 8 3.0 - 18 mmol/L    Glucose 114 (H) 74 - 99 mg/dL    BUN 18 7.0 - 18 MG/DL    Creatinine 1.62 (H) 0.6 - 1.3 MG/DL    BUN/Creatinine ratio 11 (L) 12 - 20      GFR est AA 37 (L) >60 ml/min/1.73m2    GFR est non-AA 31 (L) >60 ml/min/1.73m2    Calcium 9.6 8.5 - 10.1 MG/DL    Bilirubin, total 0.5 0.2 - 1.0 MG/DL    ALT (SGPT) 19 13 - 56 U/L    AST (SGOT) 26 15 - 37 U/L    Alk.  phosphatase 103 45 - 117 U/L    Protein, total 7.4 6.4 - 8.2 g/dL    Albumin 4.0 3.4 - 5.0 g/dL    Globulin 3.4 2.0 - 4.0 g/dL    A-G Ratio 1.2 0.8 - 1.7     CARDIAC PANEL,(CK, CKMB & TROPONIN)    Collection Time: 03/07/18  6:09 AM   Result Value Ref Range     26 - 192 U/L    CK - MB 2.2 <3.6 ng/ml    CK-MB Index 1.6 0.0 - 4.0 %    Troponin-I, Qt. <0.02 0.0 - 0.045 NG/ML   EKG, 12 LEAD, INITIAL    Collection Time: 03/07/18  6:11 AM   Result Value Ref Range    Ventricular Rate 46 BPM    Atrial Rate 46 BPM    P-R Interval 142 ms    QRS Duration 94 ms    Q-T Interval 444 ms    QTC Calculation (Bezet) 388 ms    Calculated P Axis 50 degrees    Calculated R Axis 27 degrees    Calculated T Axis 58 degrees    Diagnosis       Sinus bradycardia  RSR' or QR pattern in V1 suggests right ventricular conduction delay  Borderline ECG  When compared with ECG of 24-JUL-2014 12:14,  No significant change was found     CARDIAC PANEL,(CK, CKMB & TROPONIN)    Collection Time: 03/07/18 10:18 AM   Result Value Ref Range     26 - 192 U/L    CK - MB 1.6 <3.6 ng/ml    CK-MB Index 1.4 0.0 - 4.0 %    Troponin-I, Qt. <0.02 0.0 - 0.045 NG/ML       Radiologic Studies -   CT HEAD WO CONT   Final Result      XR CHEST PORT    (Results Pending)     CT Results  (Last 48 hours)               03/07/18 0808  CT HEAD WO CONT Final result Impression:  Impression:       1. No acute intracranial pathology. 2. Small left parietal scalp contusion without skull fracture. Narrative:  CT brain without enhancement        CPT code: 19166       Indication: Ground-level fall and hit back of head with pain. .       Technique: Unenhanced 5 mm collimation axial images obtained from the skull base   through the vertex. Dose reduction techniques used: Automated exposure control, adjustment of the   mAs and/or kVp according to patient size, standardized low-dose protocol, and/or   iterative reconstruction technique. Comparison: May 6, 2008. Findings: There is no intracranial hemorrhage. There is no evidence for mass. The gray-white matter differentiation is normal.  No extra-axial fluid   collections. The ventricles are symmetric and midline in position. Vascular   structures are symmetric in attenuation. Basilar cisterns are patent. Sinuses: Clear. Orbits: Normal.   Calvarium: Small left parietal scalp contusion without skull fracture. CXR Results  (Last 48 hours)    None            Medical Decision Making   I am the first provider for this patient. I reviewed the vital signs, available nursing notes, past medical history, past surgical history, family history and social history. Vital Signs-Reviewed the patient's vital signs. Pulse Oximetry Analysis - 99% on RA    Cardiac Monitor:  Rate: 46  Rhythm: Sinus. EKG: Interpreted by the EP Dr. Harish Justice. Time Interpreted: 3799. Rate: 46.   Rhythm: Sinus bradycardia. Interpretation: No STEMI.     Records Reviewed: Nursing Notes and Old Medical Records    Procedures:  Wound Repair  Date/Time: 3/7/2018 9:25 AM  Performed by: PAPreparation: skin prepped with Betadine  Location details: scalp  Wound length:2.5 cm or less  Foreign bodies: no foreign bodies  Irrigation solution: saline  Irrigation method: syringe  Debridement: none  Skin closure: staples  Number of sutures: 4  Patient tolerance: Patient tolerated the procedure well with no immediate complications  My total time at bedside, performing this procedure was 1-15 minutes. Provider Notes (Medical Decision Making): Pt with reported syncope and subsequent head strike. Will get CT due to trauma. Non-focal neuro exam. Check two sets of cardiac biomarkers, CBC, electrolytes, repair lacs, and update tetanus. Pt currently asymptomatic, no pain to address at this time. Pt reports she always has low HR. Is on metoprolol BID for HTN. Denies prior syncopal event. Has been in mid 42's the duration of the visit. Cardiology paged to discuss follow up.     9:34 AM Spoke with [facility] with cardiology. State she will talk with Dr. Andrade Vincent. 9:39 AM [facility] for cardiology, requesting orthostatics on the patient and states they will come see the patient in the ED.  10:24 AM Cardiology is in the room evaluating the patient. They request admission and they will consult. 11:02 AM Still awaiting hospitalist call back to admit the patient. 11:39 AM Discussed with Dr. Larry Olmstead, hospitalist who agrees with admission. Pt also requesting tylenol at this time for head pain from laceration/fall. MED RECONCILIATION:  Current Facility-Administered Medications   Medication Dose Route Frequency    sodium chloride 0.9 % bolus infusion 1,000 mL  1,000 mL IntraVENous ONCE    0.9% sodium chloride infusion  75 mL/hr IntraVENous CONTINUOUS    [START ON 3/8/2018] amLODIPine (NORVASC) tablet 2.5 mg  2.5 mg Oral DAILY    simvastatin (ZOCOR) tablet 20 mg  20 mg Oral QHS    [START ON 3/8/2018] aspirin chewable tablet 81 mg  81 mg Oral DAILY    acetaminophen (TYLENOL) tablet 650 mg  650 mg Oral NOW     Current Outpatient Prescriptions   Medication Sig    amLODIPine (NORVASC) 5 mg tablet Take 5 mg by mouth daily.  loratadine (CLARITIN) 10 mg tablet Take 10 mg by mouth.     acetaminophen (TYLENOL) 325 mg tablet Take  by mouth every four (4) hours as needed for Pain.  montelukast (SINGULAIR) 10 mg tablet Take 10 mg by mouth nightly.  metoprolol succinate (TOPROL-XL) 50 mg XL tablet Take 50 mg by mouth two (2) times a day.  Cholecalciferol, Vitamin D3, (VITAMIN D3) 1,000 unit cap Take 2 Tabs by mouth daily.  traZODone (DESYREL) 50 mg tablet Take 50 mg by mouth nightly.  lovastatin (MEVACOR) 20 mg tablet Take 20 mg by mouth nightly. Indications: takes 1/2 tab       Disposition:  Admit. Follow-up Information     None          Current Discharge Medication List        For Hospitalized Patients:    1. Hospitalization Decision Time:  The decision to hospitalize the patient was made by Dr. Gaye Bennett, cardiology, and myself at 10:27 AM  on 3/7/2018    2. Aspirin: Aspirin was not given because the patient did not present with a stroke at the time of their Emergency Department evaluation    Diagnosis     Clinical Impression:   1. Syncope and collapse    2. Fall, initial encounter    3. Laceration of scalp, initial encounter    4. Skin tear of left upper arm without complication, initial encounter    5. Bradycardia with 41-50 beats per minute    6. Contusion of scalp, initial encounter    7.  Immunization, tetanus-diphtheria        Elida العلي PA-C 11:40 AM

## 2018-03-07 NOTE — IP AVS SNAPSHOT
303 [facility] 
 
 
 [address] Patient: Manju Murillo MRN: [id number] Bea Minor About your hospitalization You were admitted on:  March 7, 2018 You last received care in the:  [address] [place] [address] You were discharged on:  March 9, 2018 Why you were hospitalized Your primary diagnosis was:  Syncope Your diagnoses also included: Arthritis, Back Pain, Cancer (Hcc), Si (Sacroiliac) Joint Dysfunction, Bursitis, Trochanteric, Hyperlipemia, Fibromyalgia, Nausea & Vomiting, Groin Pain, Bradycardia, Sinus Bradycardia, Syncope And Collapse Follow-up Information Follow up With Details Comments Contact Info Sylvia Dasilva MD On 3/15/2018 @0800am 2994 327 [facility] 2520 [facility] [address] 
[phone] Lakesha Escobar MD On 3/23/2018 @1130am 500 [facility] [address] [facility] [address] 
[phone] Your Scheduled Appointments Friday March 23, 2018 11:30 AM EDT Office Visit with Lakesha Escobar MD  
[facility]) 178 [facility], [facility] 102 [facility] [address] [phone] Discharge Orders None A check jeremiah indicates which time of day the medication should be taken. My Medications START taking these medications Instructions Each Dose to Equal  
 Morning Noon Evening Bedtime  
 aspirin 81 mg chewable tablet Start taking on:  3/10/2018 Your last dose was: Your next dose is: Take 1 Tab by mouth daily. 81 mg  
    
   
   
   
  
 hydrALAZINE 25 mg tablet Commonly known as:  APRESOLINE Your last dose was: Your next dose is: Take 1 Tab by mouth three (3) times daily. 25 mg CONTINUE taking these medications Instructions Each Dose to Equal  
 Morning Noon Evening Bedtime  
 amLODIPine 5 mg tablet Commonly known as:  Arcadio Davenport Your last dose was: Your next dose is: Take 5 mg by mouth daily. 5 mg CLARITIN 10 mg tablet Generic drug:  loratadine Your last dose was: Your next dose is: Take 10 mg by mouth. 10 mg  
    
   
   
   
  
 lovastatin 20 mg tablet Commonly known as:  MEVACOR Your last dose was: Your next dose is: Take 20 mg by mouth nightly. Indications: takes 1/2 tab  
 20 mg  
    
   
   
   
  
 SINGULAIR 10 mg tablet Generic drug:  montelukast  
   
Your last dose was: Your next dose is: Take 10 mg by mouth nightly. 10 mg  
    
   
   
   
  
 traZODone 50 mg tablet Commonly known as:  Macrina Brantley Your last dose was: Your next dose is: Take 50 mg by mouth nightly. 50 mg  
    
   
   
   
  
 TYLENOL 325 mg tablet Generic drug:  acetaminophen Your last dose was: Your next dose is: Take  by mouth every four (4) hours as needed for Pain. VITAMIN D3 1,000 unit Cap Generic drug:  cholecalciferol Your last dose was: Your next dose is: Take 2 Tabs by mouth daily. 2 Tab  
    
   
   
   
  
  
STOP taking these medications   
 metoprolol succinate 50 mg XL tablet Commonly known as:  TOPROL-XL Where to Get Your Medications Information on where to get these meds will be given to you by the nurse or doctor. ! Ask your nurse or doctor about these medications  
  aspirin 81 mg chewable tablet  
 hydrALAZINE 25 mg tablet Discharge Instructions Abdominal Pain: Care Instructions Your Care Instructions Abdominal pain has many possible causes. Some aren't serious and get better on their own in a few days. Others need more testing and treatment. If your pain continues or gets worse, you need to be rechecked and may need more tests to find out what is wrong. You may need surgery to correct the problem. Don't ignore new symptoms, such as fever, nausea and vomiting, urination problems, pain that gets worse, and dizziness. These may be signs of a more serious problem. Your doctor may have recommended a follow-up visit in the next 8 to 12 hours. If you are not getting better, you may need more tests or treatment. The doctor has checked you carefully, but problems can develop later. If you notice any problems or new symptoms, get medical treatment right away. Follow-up care is a key part of your treatment and safety. Be sure to make and go to all appointments, and call your doctor if you are having problems. It's also a good idea to know your test results and keep a list of the medicines you take. How can you care for yourself at home? · Rest until you feel better. · To prevent dehydration, drink plenty of fluids, enough so that your urine is light yellow or clear like water. Choose water and other caffeine-free clear liquids until you feel better. If you have kidney, heart, or liver disease and have to limit fluids, talk with your doctor before you increase the amount of fluids you drink. · If your stomach is upset, eat mild foods, such as rice, dry toast or crackers, bananas, and applesauce. Try eating several small meals instead of two or three large ones. · Wait until 48 hours after all symptoms have gone away before you have spicy foods, alcohol, and drinks that contain caffeine. · Do not eat foods that are high in fat. · Avoid anti-inflammatory medicines such as aspirin, ibuprofen (Advil, Motrin), and naproxen (Aleve). These can cause stomach upset. Talk to your doctor if you take daily aspirin for another health problem. When should you call for help? Call 911 anytime you think you may need emergency care. For example, call if: ? · You passed out (lost consciousness). ? · You pass maroon or very bloody stools. ? · You vomit blood or what looks like coffee grounds. ? · You have new, severe belly pain. ?Call your doctor now or seek immediate medical care if: 
? · Your pain gets worse, especially if it becomes focused in one area of your belly. ? · You have a new or higher fever. ? · Your stools are black and look like tar, or they have streaks of blood. ? · You have unexpected vaginal bleeding. ? · You have symptoms of a urinary tract infection. These may include: 
¨ Pain when you urinate. ¨ Urinating more often than usual. 
¨ Blood in your urine. ? · You are dizzy or lightheaded, or you feel like you may faint. ? Watch closely for changes in your health, and be sure to contact your doctor if: 
? · You are not getting better after 1 day (24 hours). Where can you learn more? Go to http://yoandy-ward.info/. Enter N953 in the search box to learn more about \"Abdominal Pain: Care Instructions. \" Current as of: March 20, 2017 Content Version: 11.4 © 0166-3351 UCampus. Care instructions adapted under license by Ibotta (which disclaims liability or warranty for this information). If you have questions about a medical condition or this instruction, always ask your healthcare professional. [place] [address] any warranty or liability for your use of this information. Fainting: Care Instructions Your Care Instructions When you faint, or pass out, you lose consciousness for a short time. A brief drop in blood flow to the brain often causes it. When you fall or lie down, more blood flows to your brain and you regain consciousness. Emotional stress, pain, or overheating-especially if you have been standing-can make you faint. In these cases, fainting is usually not serious. But fainting can be a sign of a more serious problem.  Your doctor may want you to have more tests to rule out other causes. The treatment you need depends on the reason why you fainted. The doctor has checked you carefully, but problems can develop later. If you notice any problems or new symptoms, get medical treatment right away. Follow-up care is a key part of your treatment and safety. Be sure to make and go to all appointments, and call your doctor if you are having problems. It's also a good idea to know your test results and keep a list of the medicines you take. How can you care for yourself at home? · Drink plenty of fluids to prevent dehydration. If you have kidney, heart, or liver disease and have to limit fluids, talk with your doctor before you increase your fluid intake. When should you call for help? Call 911 anytime you think you may need emergency care. For example, call if: 
? · You have symptoms of a heart problem. These may include: ¨ Chest pain or pressure. ¨ Severe trouble breathing. ¨ A fast or irregular heartbeat. ¨ Lightheadedness or sudden weakness. ¨ Coughing up pink, foamy mucus. ¨ Passing out. After you call 911, the  may tell you to chew 1 adult-strength or 2 to 4 low-dose aspirin. Wait for an ambulance. Do not try to drive yourself. ? · You have symptoms of a stroke. These may include: 
¨ Sudden numbness, tingling, weakness, or loss of movement in your face, arm, or leg, especially on only one side of your body. ¨ Sudden vision changes. ¨ Sudden trouble speaking. ¨ Sudden confusion or trouble understanding simple statements. ¨ Sudden problems with walking or balance. ¨ A sudden, severe headache that is different from past headaches. ? · You passed out (lost consciousness) again. ? Watch closely for changes in your health, and be sure to contact your doctor if: 
? · You do not get better as expected. Where can you learn more? Go to http://yoandy-wrad.info/. Enter H729 in the search box to learn more about \"Fainting: Care Instructions. \" Current as of: March 20, 2017 Content Version: 11.4 © 7591-9991 ProspectStream. Care instructions adapted under license by SkillPages (which disclaims liability or warranty for this information). If you have questions about a medical condition or this instruction, always ask your healthcare professional. [place]  any warranty or liability for your use of this information. Introducing [facility]! Dear Raoul Izquierdo: Thank you for requesting a Trenergi account. Our records indicate that you have previously registered for a Trenergi account but its currently inactive. Please call our Trenergi support line at [phone]. Additional Information If you have questions, please visit the Frequently Asked Questions section of the Trenergi website at [email or web address] Remember, MyChart is NOT to be used for urgent needs. For medical emergencies, dial 911. Now available from your iPhone and Android! Providers Seen During Your Hospitalization Provider Specialty Primary office phone Kirti Rey MD Emergency Medicine [phone] Norberto Doran MD Emergency Medicine [phone] Te Xiong MD Infectious Diseases [phone] Ivon Lundy MD Internal Medicine [phone] Dara Amor MD Family Practice [phone] Immunizations Administered for This Admission Name Date Tdap 3/7/2018 Your Primary Care Physician (PCP) Primary Care Physician Office Phone Office Fax [address] [phone] You are allergic to the following Allergen Reactions Darvocet A500 (Propoxyphene N-Acetaminophen) Nausea Only Iodine Other (comments) Orudis (Ketoprofen) Nausea Only Other Medication Nausea Only  
 oridus Vicodin (Hydrocodone-Acetaminophen) Nausea Only Recent Documentation Height Weight Breastfeeding? BMI OB Status Smoking Status 1.6 m 66 kg No 25.77 kg/m2 Hysterectomy Never Smoker Emergency Contacts Name Discharge Info Relation Home Work Mobile [address] [facility] CAREGIVER [3] Spouse [3] [phone] Patient Belongings The following personal items are in your possession at time of discharge: 
  Dental Appliances: None  Visual Aid: None      Home Medications: None   Jewelry: Ring  Clothing: None    Other Valuables: None Please provide this summary of care documentation to your next provider. Signatures-by signing, you are acknowledging that this After Visit Summary has been reviewed with you and you have received a copy. Patient Signature:  ____________________________________________________________ Date:  ____________________________________________________________  
  
Santo Hernandez Provider Signature:  ____________________________________________________________ Date:  ____________________________________________________________

## 2018-03-07 NOTE — ED TRIAGE NOTES
At approx 0230 pt fell in bathroom and went straight down and lost consciousness. Pt states she takes sleeping pills. Pt has bleeding to posterior of head.

## 2018-03-08 ENCOUNTER — APPOINTMENT (OUTPATIENT)
Dept: CT IMAGING | Age: 78
DRG: 312 | End: 2018-03-08
Attending: NURSE PRACTITIONER
Payer: MEDICARE

## 2018-03-08 ENCOUNTER — APPOINTMENT (OUTPATIENT)
Dept: NUCLEAR MEDICINE | Age: 78
DRG: 312 | End: 2018-03-08
Attending: NURSE PRACTITIONER
Payer: MEDICARE

## 2018-03-08 LAB
ALBUMIN SERPL-MCNC: 3.1 G/DL (ref 3.4–5)
ALBUMIN/GLOB SERPL: 1.1 {RATIO} (ref 0.8–1.7)
ALP SERPL-CCNC: 81 U/L (ref 45–117)
ALT SERPL-CCNC: 13 U/L (ref 13–56)
ANION GAP SERPL CALC-SCNC: 6 MMOL/L (ref 3–18)
AST SERPL-CCNC: 12 U/L (ref 15–37)
BASOPHILS # BLD: 0 K/UL (ref 0–0.1)
BASOPHILS NFR BLD: 0 % (ref 0–2)
BILIRUB SERPL-MCNC: 0.5 MG/DL (ref 0.2–1)
BUN SERPL-MCNC: 18 MG/DL (ref 7–18)
BUN/CREAT SERPL: 11 (ref 12–20)
CALCIUM SERPL-MCNC: 8.4 MG/DL (ref 8.5–10.1)
CHLORIDE SERPL-SCNC: 111 MMOL/L (ref 100–108)
CK MB CFR SERPL CALC: 1.2 % (ref 0–4)
CK MB SERPL-MCNC: 1.1 NG/ML (ref 5–25)
CK SERPL-CCNC: 90 U/L (ref 26–192)
CO2 SERPL-SCNC: 25 MMOL/L (ref 21–32)
CREAT SERPL-MCNC: 1.63 MG/DL (ref 0.6–1.3)
DIFFERENTIAL METHOD BLD: ABNORMAL
EOSINOPHIL # BLD: 0.1 K/UL (ref 0–0.4)
EOSINOPHIL NFR BLD: 2 % (ref 0–5)
ERYTHROCYTE [DISTWIDTH] IN BLOOD BY AUTOMATED COUNT: 12.8 % (ref 11.6–14.5)
GLOBULIN SER CALC-MCNC: 2.8 G/DL (ref 2–4)
GLUCOSE SERPL-MCNC: 94 MG/DL (ref 74–99)
HCT VFR BLD AUTO: 32.1 % (ref 35–45)
HGB BLD-MCNC: 10.2 G/DL (ref 12–16)
LYMPHOCYTES # BLD: 2.4 K/UL (ref 0.9–3.6)
LYMPHOCYTES NFR BLD: 36 % (ref 21–52)
MCH RBC QN AUTO: 32.3 PG (ref 24–34)
MCHC RBC AUTO-ENTMCNC: 31.8 G/DL (ref 31–37)
MCV RBC AUTO: 101.6 FL (ref 74–97)
MONOCYTES # BLD: 0.7 K/UL (ref 0.05–1.2)
MONOCYTES NFR BLD: 10 % (ref 3–10)
NEUTS SEG # BLD: 3.4 K/UL (ref 1.8–8)
NEUTS SEG NFR BLD: 52 % (ref 40–73)
PLATELET # BLD AUTO: 160 K/UL (ref 135–420)
PMV BLD AUTO: 10.6 FL (ref 9.2–11.8)
POTASSIUM SERPL-SCNC: 4 MMOL/L (ref 3.5–5.5)
PROT SERPL-MCNC: 5.9 G/DL (ref 6.4–8.2)
RBC # BLD AUTO: 3.16 M/UL (ref 4.2–5.3)
SODIUM SERPL-SCNC: 142 MMOL/L (ref 136–145)
TROPONIN I SERPL-MCNC: 0.02 NG/ML (ref 0–0.04)
WBC # BLD AUTO: 6.5 K/UL (ref 4.6–13.2)

## 2018-03-08 PROCEDURE — 80053 COMPREHEN METABOLIC PANEL: CPT | Performed by: INTERNAL MEDICINE

## 2018-03-08 PROCEDURE — 99218 HC RM OBSERVATION: CPT

## 2018-03-08 PROCEDURE — 74011250637 HC RX REV CODE- 250/637: Performed by: NURSE PRACTITIONER

## 2018-03-08 PROCEDURE — 85025 COMPLETE CBC W/AUTO DIFF WBC: CPT | Performed by: INTERNAL MEDICINE

## 2018-03-08 PROCEDURE — 36415 COLL VENOUS BLD VENIPUNCTURE: CPT | Performed by: INTERNAL MEDICINE

## 2018-03-08 PROCEDURE — 82550 ASSAY OF CK (CPK): CPT | Performed by: INTERNAL MEDICINE

## 2018-03-08 PROCEDURE — A9539 TC99M PENTETATE: HCPCS

## 2018-03-08 PROCEDURE — 96372 THER/PROPH/DIAG INJ SC/IM: CPT

## 2018-03-08 PROCEDURE — 65390000012 HC CONDITION CODE 44 OBSERVATION

## 2018-03-08 PROCEDURE — 74011250636 HC RX REV CODE- 250/636: Performed by: NURSE PRACTITIONER

## 2018-03-08 PROCEDURE — 96361 HYDRATE IV INFUSION ADD-ON: CPT

## 2018-03-08 PROCEDURE — 74011250637 HC RX REV CODE- 250/637: Performed by: INTERNAL MEDICINE

## 2018-03-08 RX ADMIN — Medication 10 ML: at 15:44

## 2018-03-08 RX ADMIN — VITAMIN D, TAB 1000IU (100/BT) 1000 UNITS: 25 TAB at 08:39

## 2018-03-08 RX ADMIN — MONTELUKAST SODIUM 10 MG: 10 TABLET, COATED ORAL at 20:12

## 2018-03-08 RX ADMIN — AMLODIPINE BESYLATE 2.5 MG: 5 TABLET ORAL at 15:42

## 2018-03-08 RX ADMIN — SODIUM CHLORIDE 75 ML/HR: 900 INJECTION, SOLUTION INTRAVENOUS at 01:43

## 2018-03-08 RX ADMIN — Medication 10 ML: at 22:47

## 2018-03-08 RX ADMIN — Medication 5 ML: at 06:00

## 2018-03-08 RX ADMIN — ACETAMINOPHEN 650 MG: 325 TABLET ORAL at 20:12

## 2018-03-08 RX ADMIN — ACETAMINOPHEN 650 MG: 325 TABLET ORAL at 08:46

## 2018-03-08 RX ADMIN — ACETAMINOPHEN 650 MG: 325 TABLET ORAL at 13:46

## 2018-03-08 RX ADMIN — SIMVASTATIN 20 MG: 20 TABLET, FILM COATED ORAL at 22:46

## 2018-03-08 RX ADMIN — ASPIRIN 81 MG 81 MG: 81 TABLET ORAL at 08:39

## 2018-03-08 RX ADMIN — TRAZODONE HYDROCHLORIDE 50 MG: 50 TABLET ORAL at 22:46

## 2018-03-08 NOTE — PROGRESS NOTES
[facility] Hospitalist Group  Progress Note    Patient: Dinorah Perez Age: 65 y.o. : 1940 MR#: [id number] SSN: [id number]  Date/Time: 3/8/2018 11:45 AM    Subjective/24-hour events:     Just back to floor from nuc med. No chest pain or SOB acutely - reports feeling OK overall. Family present at bedside. Assessment:   Syncope  Bradycardia  HTN  Grade 2 diastolic dysfunction  Severe tricuspid regurgitation    Plan:  Await V/Q results. Thyroid studies ordered. May have component of orthostatic hypotension based on orthostatic BPs. Monitor BPs, adjust meds as necessary. Echo with grade 2 DD and severe TR - await any additional cardiology recs. Medical management as ordered o/w. Patient refusing physical therapy. Still states that she does not want this when educated on purpose of evaluation. Family aware. Case discussed with:  [x]Patient  [x]Family  [x]Nursing  []Case Management  DVT Prophylaxis:  [x]Lovenox  []Hep SQ  []SCDs  []Coumadin   []On Heparin gtt    Objective:   VS:   Visit Vitals    /69 (BP 1 Location: Right arm, BP Patient Position: At rest)    Pulse 64    Temp 97.2 °F (36.2 °C)    Resp 18    Ht 5' 3\" (1.6 m)    Wt 68.7 kg (151 lb 6.4 oz)    SpO2 96%    Breastfeeding No    BMI 26.82 kg/m2      Tmax/24hrs: Temp (24hrs), Av.2 °F (36.8 °C), Min:97.2 °F (36.2 °C), Max:98.7 °F (37.1 °C)    Intake/Output Summary (Last 24 hours) at 18 1145  Last data filed at 18 1003   Gross per 24 hour   Intake          1993.75 ml   Output              900 ml   Net          1093.75 ml       General:  In NAD. Cardiovascular:  RRR. Pulmonary:  Clear, no wheezes. GI:  Abdomen soft, NTTP. Extremities:  Warm, no ischemia. Neuro:  Awake and alert.     Labs:    Recent Results (from the past 24 hour(s))   METABOLIC PANEL, COMPREHENSIVE    Collection Time: 18  2:15 AM   Result Value Ref Range    Sodium 142 136 - 145 mmol/L    Potassium 4.0 3.5 - 5.5 mmol/L    Chloride 111 (H) 100 - 108 mmol/L    CO2 25 21 - 32 mmol/L    Anion gap 6 3.0 - 18 mmol/L    Glucose 94 74 - 99 mg/dL    BUN 18 7.0 - 18 MG/DL    Creatinine 1.63 (H) 0.6 - 1.3 MG/DL    BUN/Creatinine ratio 11 (L) 12 - 20      GFR est AA 37 (L) >60 ml/min/1.73m2    GFR est non-AA 31 (L) >60 ml/min/1.73m2    Calcium 8.4 (L) 8.5 - 10.1 MG/DL    Bilirubin, total 0.5 0.2 - 1.0 MG/DL    ALT (SGPT) 13 13 - 56 U/L    AST (SGOT) 12 (L) 15 - 37 U/L    Alk. phosphatase 81 45 - 117 U/L    Protein, total 5.9 (L) 6.4 - 8.2 g/dL    Albumin 3.1 (L) 3.4 - 5.0 g/dL    Globulin 2.8 2.0 - 4.0 g/dL    A-G Ratio 1.1 0.8 - 1.7     CBC WITH AUTOMATED DIFF    Collection Time: 03/08/18  2:15 AM   Result Value Ref Range    WBC 6.5 4.6 - 13.2 K/uL    RBC 3.16 (L) 4.20 - 5.30 M/uL    HGB 10.2 (L) 12.0 - 16.0 g/dL    HCT 32.1 (L) 35.0 - 45.0 %    .6 (H) 74.0 - 97.0 FL    MCH 32.3 24.0 - 34.0 PG    MCHC 31.8 31.0 - 37.0 g/dL    RDW 12.8 11.6 - 14.5 %    PLATELET 752 517 - 816 K/uL    MPV 10.6 9.2 - 11.8 FL    NEUTROPHILS 52 40 - 73 %    LYMPHOCYTES 36 21 - 52 %    MONOCYTES 10 3 - 10 %    EOSINOPHILS 2 0 - 5 %    BASOPHILS 0 0 - 2 %    ABS. NEUTROPHILS 3.4 1.8 - 8.0 K/UL    ABS. LYMPHOCYTES 2.4 0.9 - 3.6 K/UL    ABS. MONOCYTES 0.7 0.05 - 1.2 K/UL    ABS. EOSINOPHILS 0.1 0.0 - 0.4 K/UL    ABS.  BASOPHILS 0.0 0.0 - 0.1 K/UL    DF AUTOMATED     CARDIAC PANEL,(CK, CKMB & TROPONIN)    Collection Time: 03/08/18  9:33 AM   Result Value Ref Range    CK 90 26 - 192 U/L    CK - MB 1.1 <3.6 ng/ml    CK-MB Index 1.2 0.0 - 4.0 %    Troponin-I, Qt. 0.02 0.0 - 0.045 NG/ML       Signed By: Solomon Barragan MD     March 8, 2018 11:45 AM

## 2018-03-08 NOTE — ROUTINE PROCESS
Bedside and Verbal shift change report given to Minna Villalta RN (oncoming nurse) by Dilan Mac (offgoing nurse). Report included the following information SBAR, Kardex, MAR and Recent Results.     SITUATION:    Code Status: Full Code   Reason for Admission: Bradycardia   Syncope   Syncope and collapse    [facility]   Problem List:       Hospital Problems  Date Reviewed: 5/24/2017          Codes Class Noted POA    Arthritis ICD-10-CM: M19.90  ICD-9-CM: 716.90  3/7/2018 Unknown        Back pain ICD-10-CM: M54.9  ICD-9-CM: 724.5  3/7/2018 Unknown        Cancer ([facility] [address] ICD-10-CM: C80.1  ICD-9-CM: 199.1  3/7/2018 Unknown    Overview Signed 3/7/2018 10:16 AM by Verna Anguiano NP     left breast cancer-left mastectomy             SI (sacroiliac) joint dysfunction ICD-10-CM: M53.3  ICD-9-CM: 724.6  3/7/2018 Unknown        Bursitis, trochanteric ICD-10-CM: M70.60  ICD-9-CM: 726.5  3/7/2018 Unknown        Hyperlipemia ICD-10-CM: E78.5  ICD-9-CM: 272.4  3/7/2018 Unknown        Fibromyalgia ICD-10-CM: M79.7  ICD-9-CM: 729.1  3/7/2018 Unknown        Nausea & vomiting ICD-10-CM: R11.2  ICD-9-CM: 787.01  3/7/2018 Unknown        Groin pain ICD-10-CM: R10.30  ICD-9-CM: 789.09  3/7/2018 Unknown        Bradycardia ICD-10-CM: R00.1  ICD-9-CM: 427.89  3/7/2018 Unknown        * (Principal)Syncope ICD-10-CM: R55  ICD-9-CM: 780.2  3/7/2018 Unknown        Sinus bradycardia ICD-10-CM: R00.1  ICD-9-CM: 427.89  3/7/2018 Yes        Syncope and collapse ICD-10-CM: R55  ICD-9-CM: 780.2  3/7/2018 Unknown              BACKGROUND:    Past Medical History:   Past Medical History:   Diagnosis Date    Arthritis     Back pain     Back problem     Bursitis, trochanteric     Cancer ([address]     left breast cancer-left mastectomy    Fibromyalgia     Groin pain     Hyperlipemia     Hypertension 9/13/2010    Nausea & vomiting     Numbness April 2008    Left side    Renal mass, right 6/4/14    Sciatica     SI (sacroiliac) joint dysfunction     Small kidney     Atrophic right kidney and densely calcified left renal artery         Patient taking anticoagulants yes     ASSESSMENT:    Changes in Assessment Throughout Shift: none     Patient has Central Line: no Reasons if yes: n/a   Patient has Benedict Cath: no Reasons if yes: n/a      Last Vitals:     Vitals:    03/07/18 2333 03/08/18 0318 03/08/18 0338 03/08/18 0719   BP: 158/66 129/70  156/64   Pulse: (!) 47 (!) 53  (!) 45   Resp: 20 18  15   Temp: 98.5 °F (36.9 °C) 98.4 °F (36.9 °C)  97.7 °F (36.5 °C)   SpO2: 93% 96%  96%   Weight:   68.7 kg (151 lb 6.4 oz)    Height:            IV and DRAINS (will only show if present)   Peripheral IV 03/07/18 Right Wrist-Site Assessment: Clean, dry, & intact  [REMOVED] Peripheral IV 03/07/18 Right Wrist-Site Assessment: Clean, dry, & intact     WOUND (if present)   Wound Type:laceration   Dressing present Dressing Present : No   Wound Concerns/Notes:  none     PAIN    Pain Assessment    Pain Intensity 1: 0 (03/08/18 0318)    Pain Location 1: Head    Pain Intervention(s) 1: Medication (see MAR)    Patient Stated Pain Goal: 0  o Interventions for Pain:  tylenol  o Intervention effective: yes  o Time of last intervention: See MAR   o Reassessment Completed: yes      Last 3 Weights:  Last 3 Recorded Weights in this Encounter    03/07/18 0548 03/08/18 0338   Weight: 65.8 kg (145 lb) 68.7 kg (151 lb 6.4 oz)     Weight change: 2.903 kg (6 lb 6.4 oz)     INTAKE/OUPUT    Current Shift:      Last three shifts: 03/06 1901 - 03/08 0700  In: 1753.8 [P.O.:480; I.V.:1273.8]  Out: -      LAB RESULTS     Recent Labs      03/08/18   0215 03/07/18   0609   WBC  6.5  8.6   HGB  10.2*  12.0   HCT  32.1*  36.6   PLT  160  186        Recent Labs      03/08/18   0215  03/07/18   0609   NA  142  140   K  4.0  4.8   GLU  94  114*   BUN  18  18   CREA  1.63*  1.62*   CA  8.4*  9.6       RECOMMENDATIONS AND DISCHARGE PLANNING     1.  Pending tests/procedures/ Plan of Care or Other Needs: continue to monitor     2. Discharge plan for patient and Needs/Barriers: home possible today    3. Estimated Discharge Date: 3/8/2018 Posted on Whiteboard in Patients Room: no      4. The patient's care plan was reviewed with the oncoming nurse. \"HEALS\" SAFETY CHECK      Fall Risk    Total Score: 2    Safety Measures: Safety Measures: Bed/Chair-Wheels locked, Bed in low position, Call light within reach    A safety check occurred in the patient's room between off going nurse and oncoming nurse listed above. The safety check included the below items  Area Items   H  High Alert Medications - Verify all high alert medication drips (heparin, PCA, etc.)   E  Equipment - Suction is set up for ALL patients (with nabor)  - Red plugs utilized for all equipment (IV pumps, etc.)  - WOWs wiped down at end of shift.  - Room stocked with oxygen, suction, and other unit-specific supplies   A  Alarms - Bed alarm is set for fall risk patients  - Ensure chair alarm is in place and activated if patient is up in a chair   L  Lines - Check IV for any infiltration  - Benedict bag is empty if patient has a Benedict   - Tubing and IV bags are labeled   S  Safety   - Room is clean, patient is clean, and equipment is clean. - Hallways are clear from equipment besides carts. - Fall bracelet on for fall risk patients  - Ensure room is clear and free of clutter  - Suction is set up for ALL patients (with nabor)  - Hallways are clear from equipment besides carts.    - Isolation precautions followed, supplies available outside room, sign posted     3sun

## 2018-03-08 NOTE — PROGRESS NOTES
Care Management Interventions  PCP Verified by CM: Yes (DR. JARAMILLO)  Palliative Care Criteria Met (RRAT>21 & CHF Dx)?: No  Mode of Transport at Discharge: Other (see comment) (FAMILY WILL TRANSPORT)  Transition of Care Consult (CM Consult): Discharge Planning  Physical Therapy Consult: Yes  Occupational Therapy Consult: Yes  Current Support Network: Lives with Spouse, Family Lives [place], Own Home  Confirm Follow Up Transport: Family  Plan discussed with Pt/Family/Caregiver: Yes (PT PLAN IS TO [address] PT IS AGREEABLE TO [address] [facility]  [facility] Resource Information Provided?: No  Discharge Location  Discharge Placement: Home with home health  Pt was admitted as an inpatient and downgraded to OBS, pt refused to sign OBS an TAYLOR paperwork and pt was provided a code 40, for pt's downgrade status. Pt's spouse will transport this pt home. Pt refused to sign OBS and TAYLOR stating that, she was informed that she has a medical condition.

## 2018-03-08 NOTE — PROGRESS NOTES
PT order received and chart was reviewed. Patient and family adamantly refuse evaluation at this time stating that the patient will not be getting up for anything until the doctors figure out why she passed out. Will continue to follow.   Barrett Fowler, PT

## 2018-03-08 NOTE — PROGRESS NOTES
Problem: Falls - Risk of  Goal: *Absence of Falls  Document Carlton Fall Risk and appropriate interventions in the flowsheet.    Outcome: Progressing Towards Goal  Fall Risk Interventions:                 Elimination Interventions: Call light in reach, Patient to call for help with toileting needs    History of Falls Interventions: Door open when patient unattended, Evaluate medications/consider consulting pharmacy, Room close to nurse's station

## 2018-03-08 NOTE — PROGRESS NOTES
[facility]      CARDIOLOGY PROGRESS NOTE  RECS:  1. Syncope- Possible  related to orthostatic hypotension- will continue monitoring for any recurrence. Monitor in telemetry for any tachy or yareli arrhthymia.  Check orthostatics. Added ivf 75 cc/hr. Has pulmonary htn on echo-r/o PE-ordered v/q scan  2. Bradycardia- with HR high 40's. would recommend to hold bb for now. Avoid CCB, digoxin or clonidine. No need at this time for PPM  Check tsh  3. Hypertension- elevated reduce Norvasc to 2.5 mg daily . Ordered compression stockings   4. Heart murmur- follow echo report   5. Hx breast cancer- on remission  6. Hx of Colon Cancer- on remission   7. Hyperlipidemia- follow lipid panel. Continue statin. Left ventricle: The ventricle was mildly dilated. Systolic function was   normal by visual assessment. Ejection fraction  was estimated to be 65 %. No obvious wall motion abnormalities identified in   the views obtained. Features were  consistent with a pseudonormal left ventricular filling pattern, with   concomitant abnormal relaxation and increased  filling pressure (grade 2 diastolic dysfunction). Right ventricle: The ventricle was dilated. Systolic pressure was moderately   increased.  Estimated peak pressure was 70  ASSESSMENT:  Hospital Problems  Date Reviewed: 5/24/2017          Codes Class Noted POA    Arthritis ICD-10-CM: M19.90  ICD-9-CM: 716.90  3/7/2018 Unknown        Back pain ICD-10-CM: M54.9  ICD-9-CM: 724.5  3/7/2018 Unknown        Cancer ([facility] [address] ICD-10-CM: C80.1  ICD-9-CM: 199.1  3/7/2018 Unknown    Overview Signed 3/7/2018 10:16 AM by Juliana Oseguera NP     left breast cancer-left mastectomy             SI (sacroiliac) joint dysfunction ICD-10-CM: M53.3  ICD-9-CM: 724.6  3/7/2018 Unknown        Bursitis, trochanteric ICD-10-CM: M70.60  ICD-9-CM: 726.5  3/7/2018 Unknown        Hyperlipemia ICD-10-CM: E78.5  ICD-9-CM: 272.4  3/7/2018 Unknown        Fibromyalgia ICD-10-CM: M79.7  ICD-9-CM: 729.1  3/7/2018 Unknown        Nausea & vomiting ICD-10-CM: R11.2  ICD-9-CM: 787.01  3/7/2018 Unknown        Groin pain ICD-10-CM: R10.30  ICD-9-CM: 789.09  3/7/2018 Unknown        Bradycardia ICD-10-CM: R00.1  ICD-9-CM: 427.89  3/7/2018 Unknown        * (Principal)Syncope ICD-10-CM: R55  ICD-9-CM: 780.2  3/7/2018 Unknown        Sinus bradycardia ICD-10-CM: R00.1  ICD-9-CM: 427.89  3/7/2018 Yes        Syncope and collapse ICD-10-CM: R55  ICD-9-CM: 780.2  3/7/2018 Unknown                SUBJECTIVE:  none    No CP or SOB    OBJECTIVE:    VS:   Visit Vitals    /69 (BP 1 Location: Right arm, BP Patient Position: At rest)    Pulse 64    Temp 97.2 °F (36.2 °C)    Resp 18    Ht 5' 3\" (1.6 m)    Wt 68.7 kg (151 lb 6.4 oz)    SpO2 96%    Breastfeeding No    BMI 26.82 kg/m2         Intake/Output Summary (Last 24 hours) at 03/08/18 1142  Last data filed at 03/08/18 1003   Gross per 24 hour   Intake          1993.75 ml   Output              900 ml   Net          1093.75 ml     Tele:sinus bradycardia    General: alert and in no apparent distress  HENT: Normocephalic, atraumatic. Normal external eye.   Neck :  no JVD  Cardiac:  S1, S2 normal  Lungs: clear to auscultation bilaterally  Abdomen: Soft, nontender, no masses  Extremities:  No c/c/e, peripheral pulses present      Labs: Results:       Chemistry Recent Labs      03/08/18   0215  03/07/18   0609   GLU  94  114*   NA  142  140   K  4.0  4.8   CL  111*  108   CO2  25  24   BUN  18  18   CREA  1.63*  1.62*   CA  8.4*  9.6   AGAP  6  8   BUCR  11*  11*   AP  81  103   TP  5.9*  7.4   ALB  3.1*  4.0   GLOB  2.8  3.4   AGRAT  1.1  1.2      CBC w/Diff Recent Labs      03/08/18   0215  03/07/18   0609   WBC  6.5  8.6   RBC  3.16*  3.64*   HGB  10.2*  12.0   HCT  32.1*  36.6   PLT  160  186   GRANS  52  76*   LYMPH  36  18*   EOS  2  1      Cardiac Enzymes Recent Labs      03/08/18   0933  03/07/18   1018   CPK  90  112   CKND1  1.2  1.4 Coagulation No results for input(s): PTP, INR, APTT in the last 72 hours. No lab exists for component: INREXT    Lipid Panel Lab Results   Component Value Date/Time    Cholesterol, total 132 03/07/2018 10:18 AM    HDL Cholesterol 55 03/07/2018 10:18 AM    LDL, calculated 62.6 03/07/2018 10:18 AM    VLDL, calculated 14.4 03/07/2018 10:18 AM    Triglyceride 72 03/07/2018 10:18 AM    CHOL/HDL Ratio 2.4 03/07/2018 10:18 AM      BNP No results for input(s): BNPP in the last 72 hours.    Liver Enzymes Recent Labs      03/08/18   0215   TP  5.9*   ALB  3.1*   AP  81   SGOT  12*      Thyroid Studies No results found for: T4, T3U, TSH, TSHEXT           Deng Roman MD   Pager # [phone]

## 2018-03-08 NOTE — PHYSICIAN ADVISORY
Letter of admission status determination - Agree with Observation    Jeffery Godoy   Age: 65 y.o. MRN: [id number]    Date of admission: 3/7/2018    I have reviewed this case as it involves a Medicare patient that was admitted as Inpatient and was subsequently changed to Observation status. The patient has already spent one midnight in the hospital, for which she had medical necessity based on her presenting condition, concern for syncope and bradycardia, with need for IV hydration, further evaluation, and clinical monitoring. Patient's condition and the documented plan of care at the time of presentation do not support the need for medically necessary hospitalization for over two midnights. Therefore, unless medically necessary hospitalization for a second midnight is anticipated, I agree with OBSERVATION status. This may change due to the medical condition of the patient and new clinical evidence as the patients care progreses. The final decision regarding the patient's hospitalization status depends on the attending physician's judgment.       Brian Messina MD, JAKI, [facility] [place] [facility]  Physician Rainer Gross.  [phone]    March 8, 2018   8:15 AM

## 2018-03-09 VITALS
DIASTOLIC BLOOD PRESSURE: 73 MMHG | HEIGHT: 63 IN | BODY MASS INDEX: 25.78 KG/M2 | OXYGEN SATURATION: 97 % | TEMPERATURE: 97.7 F | HEART RATE: 57 BPM | SYSTOLIC BLOOD PRESSURE: 156 MMHG | RESPIRATION RATE: 16 BRPM | WEIGHT: 145.5 LBS

## 2018-03-09 LAB — TSH SERPL DL<=0.05 MIU/L-ACNC: 3.49 UIU/ML (ref 0.36–3.74)

## 2018-03-09 PROCEDURE — 65660000000 HC RM CCU STEPDOWN

## 2018-03-09 PROCEDURE — 74011250637 HC RX REV CODE- 250/637: Performed by: NURSE PRACTITIONER

## 2018-03-09 PROCEDURE — 84443 ASSAY THYROID STIM HORMONE: CPT | Performed by: INTERNAL MEDICINE

## 2018-03-09 PROCEDURE — 74011250637 HC RX REV CODE- 250/637: Performed by: INTERNAL MEDICINE

## 2018-03-09 PROCEDURE — 99218 HC RM OBSERVATION: CPT

## 2018-03-09 PROCEDURE — 97161 PT EVAL LOW COMPLEX 20 MIN: CPT

## 2018-03-09 PROCEDURE — 36415 COLL VENOUS BLD VENIPUNCTURE: CPT | Performed by: INTERNAL MEDICINE

## 2018-03-09 RX ORDER — HYDRALAZINE HYDROCHLORIDE 25 MG/1
25 TABLET, FILM COATED ORAL 3 TIMES DAILY
Status: DISCONTINUED | OUTPATIENT
Start: 2018-03-09 | End: 2018-03-09 | Stop reason: HOSPADM

## 2018-03-09 RX ORDER — HYDRALAZINE HYDROCHLORIDE 25 MG/1
25 TABLET, FILM COATED ORAL 3 TIMES DAILY
Status: DISCONTINUED | OUTPATIENT
Start: 2018-03-09 | End: 2018-03-09

## 2018-03-09 RX ORDER — GUAIFENESIN 100 MG/5ML
81 LIQUID (ML) ORAL DAILY
Qty: 30 TAB | Refills: 0 | Status: SHIPPED | OUTPATIENT
Start: 2018-03-10 | End: 2022-01-05

## 2018-03-09 RX ORDER — HYDRALAZINE HYDROCHLORIDE 25 MG/1
25 TABLET, FILM COATED ORAL 3 TIMES DAILY
Qty: 90 TAB | Refills: 0 | Status: SHIPPED | OUTPATIENT
Start: 2018-03-09 | End: 2018-11-07 | Stop reason: ALTCHOICE

## 2018-03-09 RX ORDER — AMLODIPINE BESYLATE 5 MG/1
5 TABLET ORAL DAILY
Status: DISCONTINUED | OUTPATIENT
Start: 2018-03-09 | End: 2018-03-09 | Stop reason: HOSPADM

## 2018-03-09 RX ADMIN — AMLODIPINE BESYLATE 5 MG: 5 TABLET ORAL at 09:38

## 2018-03-09 RX ADMIN — Medication 5 ML: at 06:00

## 2018-03-09 RX ADMIN — ACETAMINOPHEN 650 MG: 325 TABLET ORAL at 04:42

## 2018-03-09 RX ADMIN — VITAMIN D, TAB 1000IU (100/BT) 1000 UNITS: 25 TAB at 09:38

## 2018-03-09 RX ADMIN — HYDRALAZINE HYDROCHLORIDE 25 MG: 25 TABLET, FILM COATED ORAL at 11:44

## 2018-03-09 RX ADMIN — ASPIRIN 81 MG 81 MG: 81 TABLET ORAL at 09:38

## 2018-03-09 RX ADMIN — ACETAMINOPHEN 650 MG: 325 TABLET ORAL at 11:44

## 2018-03-09 NOTE — ROUTINE PROCESS
Bedside and Verbal shift change report given to Francisco Champion RN (oncoming nurse) by Reinaldo Mera (offgoing nurse). Report included the following information SBAR, Kardex, MAR and Recent Results.     SITUATION:    Code Status: Full Code  Reason for Admission: Bradycardia  Syncope   Syncope and collapse    [facility] day: 1   Problem List:       Hospital Problems  Date Reviewed: 5/24/2017          Codes Class Noted POA    Arthritis ICD-10-CM: M19.90  ICD-9-CM: 716.90  3/7/2018 Unknown        Back pain ICD-10-CM: M54.9  ICD-9-CM: 724.5  3/7/2018 Unknown        Cancer ([facility] [address] ICD-10-CM: C80.1  ICD-9-CM: 199.1  3/7/2018 Unknown    Overview Signed 3/7/2018 10:16 AM by Alphonso Moe NP     left breast cancer-left mastectomy             SI (sacroiliac) joint dysfunction ICD-10-CM: M53.3  ICD-9-CM: 724.6  3/7/2018 Unknown        Bursitis, trochanteric ICD-10-CM: M70.60  ICD-9-CM: 726.5  3/7/2018 Unknown        Hyperlipemia ICD-10-CM: E78.5  ICD-9-CM: 272.4  3/7/2018 Unknown        Fibromyalgia ICD-10-CM: M79.7  ICD-9-CM: 729.1  3/7/2018 Unknown        Nausea & vomiting ICD-10-CM: R11.2  ICD-9-CM: 787.01  3/7/2018 Unknown        Groin pain ICD-10-CM: R10.30  ICD-9-CM: 789.09  3/7/2018 Unknown        Bradycardia ICD-10-CM: R00.1  ICD-9-CM: 427.89  3/7/2018 Unknown        * (Principal)Syncope ICD-10-CM: R55  ICD-9-CM: 780.2  3/7/2018 Unknown        Sinus bradycardia ICD-10-CM: R00.1  ICD-9-CM: 427.89  3/7/2018 Yes        Syncope and collapse ICD-10-CM: R55  ICD-9-CM: 780.2  3/7/2018 Unknown              BACKGROUND:    Past Medical History:   Past Medical History:   Diagnosis Date    Arthritis     Back pain     Back problem     Bursitis, trochanteric     Cancer ([address]     left breast cancer-left mastectomy    Fibromyalgia     Groin pain     Hyperlipemia     Hypertension 9/13/2010    Nausea & vomiting     Numbness April 2008    Left side    Renal mass, right 6/4/14    Sciatica     SI (sacroiliac) joint dysfunction  Small kidney     Atrophic right kidney and densely calcified left renal artery         Patient taking anticoagulants yes     ASSESSMENT:    Changes in Assessment Throughout Shift: none     Patient has Central Line: no Reasons if yes: n/a   Patient has Benedict Cath: no Reasons if yes: n/a      Last Vitals:     Vitals:    03/08/18 2021 03/08/18 2343 03/09/18 0313 03/09/18 0727   BP: 192/64 166/60 186/65 183/69   Pulse: (!) 54 (!) 55 (!) 51 (!) 55   Resp: 19 20 18 18   Temp: 98.7 °F (37.1 °C) 98.5 °F (36.9 °C) 98.2 °F (36.8 °C) 96.9 °F (36.1 °C)   SpO2: 94% 97% 95% 98%   Weight:   66 kg (145 lb 8 oz)    Height:            IV and DRAINS (will only show if present)   Peripheral IV 03/07/18 Right Wrist-Site Assessment: Clean, dry, & intact  [REMOVED] Peripheral IV 03/07/18 Right Wrist-Site Assessment: Clean, dry, & intact     WOUND (if present)   Wound Type:laceration   Dressing present Dressing Present : No   Wound Concerns/Notes:  none     PAIN    Pain Assessment    Pain Intensity 1: 0 (03/09/18 0530)    Pain Location 1: Head    Pain Intervention(s) 1: Medication (see MAR)    Patient Stated Pain Goal: 0  o Interventions for Pain:  tylenol  o Intervention effective: yes  o Time of last intervention: See MAR   o Reassessment Completed: yes      Last 3 Weights:  Last 3 Recorded Weights in this Encounter    03/07/18 0548 03/08/18 0338 03/09/18 0313   Weight: 65.8 kg (145 lb) 68.7 kg (151 lb 6.4 oz) 66 kg (145 lb 8 oz)     Weight change: -2.676 kg (-5 lb 14.4 oz)     INTAKE/OUPUT    Current Shift:      Last three shifts: 03/07 1901 - 03/09 0700  In: 3998.8 [P.O.:960; I.V.:3038.8]  Out: 3300 [Urine:3300]     LAB RESULTS     Recent Labs      03/08/18   0215  03/07/18   0609   WBC  6.5  8.6   HGB  10.2*  12.0   HCT  32.1*  36.6   PLT  160  186        Recent Labs      03/08/18   0215  03/07/18   0609   NA  142  140   K  4.0  4.8   GLU  94  114*   BUN  18  18   CREA  1.63*  1.62*   CA  8.4*  9.6       RECOMMENDATIONS AND DISCHARGE PLANNING     1. Pending tests/procedures/ Plan of Care or Other Needs: continue to monitor     2. Discharge plan for patient and Needs/Barriers: home possible today    3. Estimated Discharge Date: 3/8/2018 Posted on Whiteboard in Patients Room: no      4. The patient's care plan was reviewed with the oncoming nurse. \"HEALS\" SAFETY CHECK      Fall Risk    Total Score: 2    Safety Measures: Safety Measures: Bed/Chair-Wheels locked, Bed in low position, Call light within reach    A safety check occurred in the patient's room between off going nurse and oncoming nurse listed above. The safety check included the below items  Area Items   H  High Alert Medications - Verify all high alert medication drips (heparin, PCA, etc.)   E  Equipment - Suction is set up for ALL patients (with nabor)  - Red plugs utilized for all equipment (IV pumps, etc.)  - WOWs wiped down at end of shift.  - Room stocked with oxygen, suction, and other unit-specific supplies   A  Alarms - Bed alarm is set for fall risk patients  - Ensure chair alarm is in place and activated if patient is up in a chair   L  Lines - Check IV for any infiltration  - Benedict bag is empty if patient has a Benedict   - Tubing and IV bags are labeled   S  Safety   - Room is clean, patient is clean, and equipment is clean. - Hallways are clear from equipment besides carts. - Fall bracelet on for fall risk patients  - Ensure room is clear and free of clutter  - Suction is set up for ALL patients (with nabor)  - Hallways are clear from equipment besides carts.    - Isolation precautions followed, supplies available outside room, sign posted     Tiera Arredondo

## 2018-03-09 NOTE — ROUTINE PROCESS
Bedside shift change report given to Bree Mariscal RN (oncoming nurse) by Vik Gonzalez RN (offgoing nurse). Report included the following information SBAR, Kardex and Recent Results.

## 2018-03-09 NOTE — PHYSICIAN ADVISORY
Letter of Admission Status Determination: Upgrade to Inpatient       Manju Murillo was hospitalized on 3/7/2018. Her hospital stay has already crossed 2 medically necessary midnights; ongoing hospitalization was warranted for this patient with syncope, orthostatic hypotension, persistent bradycardia, and severe tricuspid regurgitation, requiring continued IV hydration, telemetry, medication adjustment, further evaluation, and close clinical monitoring. Since Manju Murillo required medically necessary hospital care spanning over at least two midnights, she has met the benchmark for Inpatient Admission status in accordance with CMS regulation Section 43 .3. Based on the documented clinical condition and care plan, we recommend upgrading patient's hospitalization status from Observation to INPATIENT status. The final decision regarding the patient's hospitalization status depends on the attending physician's clinical judgment.        Ceferino Sinha MD, JAKI, [address] [facility]  Physician Rainer Gross.  [phone]    March 9, 2018   7:41 AM

## 2018-03-09 NOTE — PROGRESS NOTES
0800-  Assumed care. Report received. Orders, notes, meds, labs and plan of care reviewed. Pt awake and with family. Denies pain or complaint. Awaiting lab tests this am with possible discharge. Assessment completed. VS stable. 1200-  Pt awaiting VQ scan this am.  Cardiology will determine if able to be discharged this afternoon. Deneis pain ro complaint. VS stable. 1600-  No interval changes. Family remains at bedside. Pt continues to deny pain or complaint. VS stable. 2000-  No change. Pt to have labs drawn in am per cardiology. VS stable. No pian or complaint.   [facility]

## 2018-03-09 NOTE — PROGRESS NOTES
Pt discharged home with daughter. Discharge instructions given, e-signature obtained.  Pt left unit in no distress with stable vital signs

## 2018-03-09 NOTE — PROGRESS NOTES
conducted a Follow up consultation and Spiritual Assessment for [facility], who is a 65 y.o.,female. The  provided the following Interventions:  Continued the relationship of care and support. Listened empathically. Chart reviewed. The following outcomes were achieved:i  Explained the Advance Directive Form with patient and daughter. She wants to talk with her spouse before completing it. Patient expressed gratitude for 's visit. Assessment:  There are no further spiritual or Sikhism issues which require Spiritual Care Services interventions at this time. Plan:  Chaplains will continue to follow and will provide pastoral care on an as needed/requested basis.  recommends bedside caregivers page  on duty if patient shows signs of acute spiritual or emotional distress.      [facility] [facility] Certified [address] [place] [address]   [phone]

## 2018-03-09 NOTE — PROGRESS NOTES
Problem: Falls - Risk of  Goal: *Absence of Falls  Document Carl[place] Fall Risk and appropriate interventions in the flowsheet.    Outcome: Progressing Towards Goal  Fall Risk Interventions:  Mobility Interventions: Strengthening exercises (ROM-active/passive)              Elimination Interventions: Call light in reach    History of Falls Interventions: Room close to nurse's station, Evaluate medications/consider consulting pharmacy, Door open when patient unattended

## 2018-03-09 NOTE — DISCHARGE INSTRUCTIONS
Abdominal Pain: Care Instructions  Your Care Instructions    Abdominal pain has many possible causes. Some aren't serious and get better on their own in a few days. Others need more testing and treatment. If your pain continues or gets worse, you need to be rechecked and may need more tests to find out what is wrong. You may need surgery to correct the problem. Don't ignore new symptoms, such as fever, nausea and vomiting, urination problems, pain that gets worse, and dizziness. These may be signs of a more serious problem. Your doctor may have recommended a follow-up visit in the next 8 to 12 hours. If you are not getting better, you may need more tests or treatment. The doctor has checked you carefully, but problems can develop later. If you notice any problems or new symptoms, get medical treatment right away. Follow-up care is a key part of your treatment and safety. Be sure to make and go to all appointments, and call your doctor if you are having problems. It's also a good idea to know your test results and keep a list of the medicines you take. How can you care for yourself at home? · Rest until you feel better. · To prevent dehydration, drink plenty of fluids, enough so that your urine is light yellow or clear like water. Choose water and other caffeine-free clear liquids until you feel better. If you have kidney, heart, or liver disease and have to limit fluids, talk with your doctor before you increase the amount of fluids you drink. · If your stomach is upset, eat mild foods, such as rice, dry toast or crackers, bananas, and applesauce. Try eating several small meals instead of two or three large ones. · Wait until 48 hours after all symptoms have gone away before you have spicy foods, alcohol, and drinks that contain caffeine. · Do not eat foods that are high in fat. · Avoid anti-inflammatory medicines such as aspirin, ibuprofen (Advil, Motrin), and naproxen (Aleve).  These can cause stomach upset. Talk to your doctor if you take daily aspirin for another health problem. When should you call for help? Call 911 anytime you think you may need emergency care. For example, call if:  ? · You passed out (lost consciousness). ? · You pass maroon or very bloody stools. ? · You vomit blood or what looks like coffee grounds. ? · You have new, severe belly pain. ?Call your doctor now or seek immediate medical care if:  ? · Your pain gets worse, especially if it becomes focused in one area of your belly. ? · You have a new or higher fever. ? · Your stools are black and look like tar, or they have streaks of blood. ? · You have unexpected vaginal bleeding. ? · You have symptoms of a urinary tract infection. These may include:  ¨ Pain when you urinate. ¨ Urinating more often than usual.  ¨ Blood in your urine. ? · You are dizzy or lightheaded, or you feel like you may faint. ? Watch closely for changes in your health, and be sure to contact your doctor if:  ? · You are not getting better after 1 day (24 hours). Where can you learn more? Go to http://yoandyHeliotrope Technologiesward.info/. Enter F998 in the search box to learn more about \"Abdominal Pain: Care Instructions. \"  Current as of: March 20, 2017  Content Version: 11.4  © 4835-8054 [employer]. Care instructions adapted under license by [employer] (which disclaims liability or warranty for this information). If you have questions about a medical condition or this instruction, always ask your healthcare professional. [place] [address] any warranty or liability for your use of this information. Fainting: Care Instructions  Your Care Instructions    When you faint, or pass out, you lose consciousness for a short time. A brief drop in blood flow to the brain often causes it. When you fall or lie down, more blood flows to your brain and you regain consciousness.   Emotional stress, pain, or overheating-especially if you have been standing-can make you faint. In these cases, fainting is usually not serious. But fainting can be a sign of a more serious problem. Your doctor may want you to have more tests to rule out other causes. The treatment you need depends on the reason why you fainted. The doctor has checked you carefully, but problems can develop later. If you notice any problems or new symptoms, get medical treatment right away. Follow-up care is a key part of your treatment and safety. Be sure to make and go to all appointments, and call your doctor if you are having problems. It's also a good idea to know your test results and keep a list of the medicines you take. How can you care for yourself at home? · Drink plenty of fluids to prevent dehydration. If you have kidney, heart, or liver disease and have to limit fluids, talk with your doctor before you increase your fluid intake. When should you call for help? Call 911 anytime you think you may need emergency care. For example, call if:  ? · You have symptoms of a heart problem. These may include:  ¨ Chest pain or pressure. ¨ Severe trouble breathing. ¨ A fast or irregular heartbeat. ¨ Lightheadedness or sudden weakness. ¨ Coughing up pink, foamy mucus. ¨ Passing out. After you call 911, the  may tell you to chew 1 adult-strength or 2 to 4 low-dose aspirin. Wait for an ambulance. Do not try to drive yourself. ? · You have symptoms of a stroke. These may include:  ¨ Sudden numbness, tingling, weakness, or loss of movement in your face, arm, or leg, especially on only one side of your body. ¨ Sudden vision changes. ¨ Sudden trouble speaking. ¨ Sudden confusion or trouble understanding simple statements. ¨ Sudden problems with walking or balance. ¨ A sudden, severe headache that is different from past headaches. ? · You passed out (lost consciousness) again. ? Watch closely for changes in your health, and be sure to contact your doctor if:  ? · You do not get better as expected. Where can you learn more? Go to http://yoandy-ward.info/. Enter G344 in the search box to learn more about \"Fainting: Care Instructions. \"  Current as of: March 20, 2017  Content Version: 11.4  © 6669-6455 [employer]. Care instructions adapted under license by [employer] (which disclaims liability or warranty for this information). If you have questions about a medical condition or this instruction, always ask your healthcare professional. [address] any warranty or liability for your use of this information.

## 2018-03-09 NOTE — PROGRESS NOTES
[facility]      CARDIOLOGY PROGRESS NOTE  RECS:  1. Syncope- Possible    Orthostatic hypotension-resolved. Needs compression stockings  2. Hypertension-elevated Norvasc 5 mg added hydralazine 25 mg tid. 3. Pulmonary htn on echo- PE has been r/o  v/q scan negative for PE- needs w/u Right heart cath in the future   4. Bradycardia- resolved. Avoid CCB, bb digoxin or clonidine. No need at this time for PPM  5. Hx breast cancer- on remission  6. Hx of Colon Cancer- on remission   7. Hyperlipidemia-Continue statin. 8. Tricuspid regurgitation-moderate-severe        Echo 3/18  Left ventricle: The ventricle was mildly dilated. Systolic function was   normal by visual assessment. Ejection fraction  was estimated to be 65 %. No obvious wall motion abnormalities identified in   the views obtained. Features were  consistent with a pseudonormal left ventricular filling pattern, with   concomitant abnormal relaxation and increased  filling pressure (grade 2 diastolic dysfunction). Right ventricle: The ventricle was dilated. Systolic pressure was moderately   increased.  Estimated peak pressure was 70  ASSESSMENT:  Hospital Problems  Date Reviewed: 5/24/2017          Codes Class Noted POA    Arthritis ICD-10-CM: M19.90  ICD-9-CM: 716.90  3/7/2018 Unknown        Back pain ICD-10-CM: M54.9  ICD-9-CM: 724.5  3/7/2018 Unknown        Cancer ([facility] ICD-10-CM: C80.1  ICD-9-CM: 199.1  3/7/2018 Unknown    Overview Signed 3/7/2018 10:16 AM by Anup Delacruz NP     left breast cancer-left mastectomy             SI (sacroiliac) joint dysfunction ICD-10-CM: M53.3  ICD-9-CM: 724.6  3/7/2018 Unknown        Bursitis, trochanteric ICD-10-CM: M70.60  ICD-9-CM: 726.5  3/7/2018 Unknown        Hyperlipemia ICD-10-CM: E78.5  ICD-9-CM: 272.4  3/7/2018 Unknown        Fibromyalgia ICD-10-CM: M79.7  ICD-9-CM: 729.1  3/7/2018 Unknown        Nausea & vomiting ICD-10-CM: R11.2  ICD-9-CM: 787.01  3/7/2018 Unknown Groin pain ICD-10-CM: R10.30  ICD-9-CM: 789.09  3/7/2018 Unknown        Bradycardia ICD-10-CM: R00.1  ICD-9-CM: 427.89  3/7/2018 Unknown        * (Principal)Syncope ICD-10-CM: R55  ICD-9-CM: 780.2  3/7/2018 Unknown        Sinus bradycardia ICD-10-CM: R00.1  ICD-9-CM: 427.89  3/7/2018 Yes        Syncope and collapse ICD-10-CM: R55  ICD-9-CM: 780.2  3/7/2018 Unknown                SUBJECTIVE:      No CP or SOB    OBJECTIVE:    VS:   Visit Vitals    /69 (BP 1 Location: Right arm, BP Patient Position: At rest)    Pulse (!) 55    Temp 96.9 °F (36.1 °C)    Resp 18    Ht 5' 3\" (1.6 m)    Wt 66 kg (145 lb 8 oz)    SpO2 98%    Breastfeeding No    BMI 25.77 kg/m2         Intake/Output Summary (Last 24 hours) at 03/09/18 1006  Last data filed at 03/09/18 0427   Gross per 24 hour   Intake          1773.75 ml   Output             2400 ml   Net          -626.25 ml     Tele:sinus bradycardia    General: alert and in no apparent distress  HENT: Normocephalic, atraumatic. Normal external eye. Neck :  no JVD  Cardiac:  S1, S2 normal  Lungs: clear to auscultation bilaterally  Abdomen: Soft, nontender, no masses  Extremities:  No c/c/e, peripheral pulses present      Labs: Results:       Chemistry Recent Labs      03/08/18   0215  03/07/18   0609   GLU  94  114*   NA  142  140   K  4.0  4.8   CL  111*  108   CO2  25  24   BUN  18  18   CREA  1.63*  1.62*   CA  8.4*  9.6   AGAP  6  8   BUCR  11*  11*   AP  81  103   TP  5.9*  7.4   ALB  3.1*  4.0   GLOB  2.8  3.4   AGRAT  1.1  1.2      CBC w/Diff Recent Labs      03/08/18   0215  03/07/18   0609   WBC  6.5  8.6   RBC  3.16*  3.64*   HGB  10.2*  12.0   HCT  32.1*  36.6   PLT  160  186   GRANS  52  76*   LYMPH  36  18*   EOS  2  1      Cardiac Enzymes Recent Labs      03/08/18   0933  03/07/18   1018   CPK  90  112   CKND1  1.2  1.4      Coagulation No results for input(s): PTP, INR, APTT in the last 72 hours.     No lab exists for component: INREXT, INREXT    Lipid Panel Lab Results   Component Value Date/Time    Cholesterol, total 132 03/07/2018 10:18 AM    HDL Cholesterol 55 03/07/2018 10:18 AM    LDL, calculated 62.6 03/07/2018 10:18 AM    VLDL, calculated 14.4 03/07/2018 10:18 AM    Triglyceride 72 03/07/2018 10:18 AM    CHOL/HDL Ratio 2.4 03/07/2018 10:18 AM      BNP No results for input(s): BNPP in the last 72 hours. Liver Enzymes Recent Labs      03/08/18   0215   TP  5.9*   ALB  3.1*   AP  81   SGOT  12*      Thyroid Studies Lab Results   Component Value Date/Time    TSH 3.49 03/09/2018 04:35 AM              Verona Suarez, NP-C    I have independently evaluated taken history and examined the patient. All relevant labs and testing data's are reviewed. Care plan discussed and updated after review.   David Magaña MD

## 2018-03-09 NOTE — PROGRESS NOTES
Problem: Mobility Impaired (Adult and Pediatric)  Goal: *Acute Goals and Plan of Care (Insert Text)  Outcome: Resolved/Met Date Met: 03/09/18  physical Therapy EVALUATION & Discharge    Patient: Jemima Gomes (81 y.o. female)  Date: 3/9/2018  Primary Diagnosis: Bradycardia  Syncope  Syncope and collapse  Syncope        Precautions: fall       ASSESSMENT AND RECOMMENDATIONS:  Based on the objective data described below, the patient presents at independent level with ambulation and transfers without an assistive device. Patients BP sitting edge of bed 173/69, standing 171/71 after walking 177/86. Skilled physical therapy is not indicated at this time. Discharge Recommendations: None  Further Equipment Recommendations for Discharge: N/A      G-CODES:     Mobility  Current  CH= 0%   Goal  CH= 0%  D/C  CH= 0%. The severity rating is based on the Level of Assistance required for Functional Mobility and ADLs. Eval Complexity: History: MEDIUM  Complexity : 1-2 comorbidities / personal factors will impact the outcome/ POC Exam:LOW Complexity : 1-2 Standardized tests and measures addressing body structure, function, activity limitation and / or participation in recreation  Presentation: MEDIUM Complexity : Evolving with changing characteristics  Clinical Decision Making:Low Complexity , Overall Complexity:LOW     SUBJECTIVE:   Patient stated I feel better. 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 Juliette Nguyen, [facility]    HX ORTHOPAEDIC Right 06/24/2003    carpel tunnel surgery    HX ORTHOPAEDIC Left 4/2004    carpel tunnel surgery     Barriers to Learning/Limitations: None  Compensate with: visual, verbal, tactile, kinesthetic cues/model  Prior Level of Function/Home Situation: independent with mobility without an assistive device  Home Situation  Home Environment: Private residence  # Steps to Enter: 4  One/Two Story Residence: One story  Living Alone: No  Support Systems: Family member(s)  Patient Expects to be Discharged to[de-identified] Private residence  Current DME Used/Available at Home: None  Critical Behavior:   calm and cooperative   Strength:    Strength: Within functional limits (B LEs)      Tone & Sensation:   Tone: Normal (B LEs)       Range Of Motion:  AROM: Within functional limits (B LEs)      Functional Mobility:  Bed Mobility:  Rolling: Independent  Supine to Sit: Independent  Sit to Supine: Independent     Transfers:  Sit to Stand: Independent  Stand to Sit: Independent     Balance:   Sitting: Intact  Standing: Intact; Without support  Ambulation/Gait Training:  Distance (ft): 500 Feet (ft)  Assistive Device:  (none)  Ambulation - Level of Assistance: Independent  Pain:  Pt reports 0/10 pain or discomfort prior to treatment.    Pt reports 0/10 pain or discomfort post treatment. Activity Tolerance:   good  Please refer to the flowsheet for vital signs taken during this treatment.   After treatment:   []         Patient left in no apparent distress sitting up in chair  [x]         Patient left in no apparent distress in bed  [x]         Call bell left within reach  [x]         Nursing notified  [x]         Caregiver present  []         Bed alarm activated    COMMUNICATION/EDUCATION:   [x]         Fall prevention education was provided and the patient/caregiver indicated understanding. [x]         Patient/family have participated as able in goal setting and plan of care.-eval only  []         Patient/family agree to work toward stated goals and plan of care. []         Patient understands intent and goals of therapy, but is neutral about his/her participation. []         Patient is unable to participate in goal setting and plan of care.   Educated patient on ambulating throughout the day with family assisting    Thank you for this referral.  Mica Roberts, PT   Time Calculation: 18 mins

## 2018-03-19 ENCOUNTER — HOSPITAL ENCOUNTER (EMERGENCY)
Age: 78
Discharge: HOME | End: 2018-03-19
Attending: EMERGENCY MEDICINE
Payer: MEDICARE

## 2018-03-19 VITALS
TEMPERATURE: 97.1 F | RESPIRATION RATE: 16 BRPM | OXYGEN SATURATION: 98 % | DIASTOLIC BLOOD PRESSURE: 67 MMHG | HEART RATE: 89 BPM | SYSTOLIC BLOOD PRESSURE: 140 MMHG

## 2018-03-19 DIAGNOSIS — Z48.02 REMOVAL OF STAPLE: Primary | ICD-10-CM

## 2018-03-19 PROCEDURE — 77030008027

## 2018-03-19 PROCEDURE — 75810000275 HC EMERGENCY DEPT VISIT NO LEVEL OF CARE

## 2018-03-19 NOTE — ED PROVIDER NOTES
EMERGENCY DEPARTMENT HISTORY AND PHYSICAL EXAM    2:40 PM      Date: 3/19/2018  Patient Name: Koki Villegas    History of Presenting Illness     Chief Complaint   Patient presents with    Staple Removal         History Provided By: Patient    Additional History (Context): Koki Villegas is a 65 y.o. female with a pertinent history of HTN, presenting to the ED for the removal of 4 staples on her scalp that were placed in the ED on 3/7/2018 (approx 3 weeks ago). As the patient is without physical symptoms or complaints of pain, there is no severity of pain, quality of pain, duration, modifying factors, or associated signs and symptoms regarding the pt's presenting complaint. PCP: Jory Dasilva MD    Chief Complaint: Staple removal  Duration:  N/A  Timing:  N/A  Location: scalp  Quality: N/A  Severity: N/A  Modifying Factors: N/A  Associated Symptoms: denies any other associated signs or symptoms      Current Outpatient Prescriptions   Medication Sig Dispense Refill    aspirin 81 mg chewable tablet Take 1 Tab by mouth daily. 30 Tab 0    hydrALAZINE (APRESOLINE) 25 mg tablet Take 1 Tab by mouth three (3) times daily. 90 Tab 0    amLODIPine (NORVASC) 5 mg tablet Take 5 mg by mouth daily.  loratadine (CLARITIN) 10 mg tablet Take 10 mg by mouth.  acetaminophen (TYLENOL) 325 mg tablet Take  by mouth every four (4) hours as needed for Pain.  montelukast (SINGULAIR) 10 mg tablet Take 10 mg by mouth nightly.  Cholecalciferol, Vitamin D3, (VITAMIN D3) 1,000 unit cap Take 2 Tabs by mouth daily.  traZODone (DESYREL) 50 mg tablet Take 50 mg by mouth nightly.  lovastatin (MEVACOR) 20 mg tablet Take 20 mg by mouth nightly.     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 Brien Hemphill, [facility]    HX ORTHOPAEDIC Right 06/24/2003    carpel tunnel surgery    HX ORTHOPAEDIC Left 4/2004    carpel tunnel surgery       Family History:  Family History   Problem Relation Age of Onset    Hypertension Other      parent and sibling    Stroke Other     Heart Disease Other 48     sibling    Cancer Sister      Liver    Cancer Sister      Liver    Breast Cancer Maternal Aunt     Breast Cancer Paternal Aunt        Social History:  Social History   Substance Use Topics    Smoking status: Never Smoker    Smokeless tobacco: Never Used      Comment: no    Alcohol use No       Allergies: Allergies   Allergen Reactions    Darvocet A500 [Propoxyphene N-Acetaminophen] Nausea Only    Iodine Other (comments)    Orudis [Ketoprofen] Nausea Only    Other Medication Nausea Only     oridus    Vicodin [Hydrocodone-Acetaminophen] Nausea Only         Review of Systems     Review of Systems   Constitutional: Negative for chills and fever. Skin: Negative for rash. Staple removal   All other systems reviewed and are negative.         Physical Exam     Visit Vitals    /67 (BP 1 Location: Right arm, BP Patient Position: Sitting)    Pulse 89    Temp 97.1 °F (36.2 °C)    Resp 16    SpO2 98%       Physical Exam   Constitutional: She appears well-developed and well-nourished. HENT:   Head: Atraumatic.   4 staples. Wound cdi; post scalp. Eyes: Pupils are equal, round, and reactive to light. Musculoskeletal: Normal range of motion. Skin: Skin is warm. 4 staples removed ; no complications     Diagnostic Study Results         Medical Decision Making         Diagnosis     1. Removal of staple        _______________________________    Attestations:  Scribe Attestation     Dayami Sam acting as a scribe for and in the presence of Kelsey Nicole MD      March 19, 2018 at 2:42 PM       Provider Attestation:      I personally performed the services described in the documentation, reviewed the documentation, as recorded by the scribe in my presence, and it accurately and completely records my words and actions.  March 19, 2018 at 2:42 PM - Kelsey Nicole MD    _______________________________

## 2018-03-19 NOTE — DISCHARGE INSTRUCTIONS
Learning About Stitches and Staples Removal  When are stitches and staples removed? Your doctor will tell you when to have your stitches or staples removed, usually in 7 to 14 days. How long you'll be told to wait will depend on things like where the wound is located, how big and how deep the wound is, and what your general health is like. Do not remove the stitches on your own. Stitches on the face are usually removed within a week. But stitches and staples on other areas of the body, such as on the back or belly or over a joint, may need to stay in place longer, often a week or two. Be sure to follow your doctor's instructions. How are stitches and staples removed? It usually doesn't hurt when the doctor removes the stitches or staples. You may feel a tug as each stitch or staple is removed. · You will either be seated or lying down. · To remove stitches, the doctor will use scissors to cut each of the knots and then pull the threads out. · To remove staples, the doctor will use a tool to take out the staples one at a time. · The area may still feel tender after the stitches or staples are gone. But it should feel better within a few minutes or up to a few hours. What can you expect after stitches and staples are removed? Depending on the type and location of the cut, you will have a scar. Scars usually fade over time. Keep the area clean, but you won't need a bandage. When should you call for help? Call your doctor now or seek immediate medical care if :  · You have new pain, or your pain gets worse. · You have trouble moving the area near the scar. · You have symptoms of infection, such as:  ¨ Increased pain, swelling, warmth, or redness around the scar. ¨ Red streaks leading from the scar. ¨ Pus draining from the scar. ¨ A fever. Watch closely for changes in your health, and be sure to contact your doctor if:  · The scar opens. · You do not get better as expected.   Follow-up care is a key part of your treatment and safety. Be sure to make and go to all appointments, and call your doctor if you do not get better as expected. It's also a good idea to keep a list of the medicines you take. Where can you learn more? Go to http://yoandy-ward.info/. Enter X722 in the search box to learn more about \"Learning About Stitches and Staples Removal.\"  Current as of: March 20, 2017  Content Version: 11.4  © 0157-0381 [employer], [employer]. Care instructions adapted under license by [employer] (which disclaims liability or warranty for this information). If you have questions about a medical condition or this instruction, always ask your healthcare professional. [address] any warranty or liability for your use of this information.

## 2018-03-21 ENCOUNTER — OFFICE VISIT (OUTPATIENT)
Dept: CARDIOLOGY CLINIC | Age: 78
End: 2018-03-21

## 2018-03-21 VITALS
WEIGHT: 149 LBS | DIASTOLIC BLOOD PRESSURE: 64 MMHG | SYSTOLIC BLOOD PRESSURE: 171 MMHG | HEIGHT: 63 IN | BODY MASS INDEX: 26.4 KG/M2 | HEART RATE: 73 BPM

## 2018-03-21 DIAGNOSIS — I27.20 PULMONARY HTN (HCC): Primary | ICD-10-CM

## 2018-03-21 DIAGNOSIS — N18.9 CHRONIC KIDNEY DISEASE, UNSPECIFIED CKD STAGE: ICD-10-CM

## 2018-03-21 DIAGNOSIS — E78.5 HYPERLIPIDEMIA, UNSPECIFIED HYPERLIPIDEMIA TYPE: ICD-10-CM

## 2018-03-21 DIAGNOSIS — R55 SYNCOPE, UNSPECIFIED SYNCOPE TYPE: ICD-10-CM

## 2018-03-21 DIAGNOSIS — I10 ESSENTIAL HYPERTENSION: ICD-10-CM

## 2018-03-21 RX ORDER — GUAIFENESIN 600 MG/1
600 TABLET, EXTENDED RELEASE ORAL 2 TIMES DAILY
COMMUNITY
End: 2022-01-05

## 2018-03-21 NOTE — MR AVS SNAPSHOT
[facility] 
 
 
 [address], [address] [facility] [address] 
[phone] Patient: Chilo Cox MRN: [id number] Benjamin Macias Visit Information Date & Time Provider Department Dept. Phone Encounter #  
 3/21/2018 12:00 PM America Etienne MD [facility] [address] [place] [address] [id number] Follow-up Instructions Return in about 6 weeks (around 5/2/2018). Your Appointments 5/2/2018 10:00 AM  
Office Visit with America Etienne MD  
[facility]) Appt Note: post PFT/Pulse O2/labs [address], [address] [facility] [address]  
[address], [address] [address]  
  
    
 5/23/2018 11:15 AM  
ESTABLISHED PATIENT with Avelino Kern MD  
[facility]) Appt Note: Yearly [facility], Rev Imaging- to be done at [address] [place] [address] [address] [place] [address]  
  
   
 [address] [facility] [address] [address] [address] Upcoming Health Maintenance Date Due ZOSTER VACCINE AGE 60> 6/18/1999 GLAUCOMA SCREENING Q2Y 8/18/2004 Bone Densitometry (Dexa) Screening 8/18/2004 Pneumococcal 65+ High/Highest Risk (1 of 2 - PCV13) 8/18/2004 Influenza Age 5 to Adult 8/1/2017 DTaP/Tdap/Td series (2 - Td) 3/7/2028 Allergies as of 3/21/2018  Review Complete On: 3/21/2018 By: America Etienne MD  
  
 Severity Noted Reaction Type Reactions Darvocet A500 [Propoxyphene N-acetaminophen]    Nausea Only Iodine  03/07/2018    Other (comments) Orudis [Ketoprofen]  07/24/2014    Nausea Only Other Medication    Nausea Only  
 oridus Vicodin [Hydrocodone-acetaminophen]    Nausea Only Current Immunizations  Never Reviewed Name Date Tdap 3/7/2018 10:20 AM  
  
 Not reviewed this visit You Were Diagnosed With   
  
 Codes Comments Pulmonary HTN    -  Primary ICD-10-CM: I27.20 ICD-9-CM: 416.8 severe ? etiology 
has bronchitis Essential hypertension     ICD-10-CM: I10 
ICD-9-CM: 401.9 Hyperlipidemia, unspecified hyperlipidemia type     ICD-10-CM: E78.5 ICD-9-CM: 272.4 Syncope, unspecified syncope type     ICD-10-CM: R55 
ICD-9-CM: 780.2 likely orthostatic Chronic kidney disease, unspecified CKD stage     ICD-10-CM: N18.9 ICD-9-CM: 057. 9 Vitals BP Pulse Height(growth percentile) Weight(growth percentile) BMI OB Status 171/64 73 5' 3\" (1.6 m) 149 lb (67.6 kg) 26.39 kg/m2 Hysterectomy Smoking Status Never Smoker Vitals History BMI and BSA Data Body Mass Index Body Surface Area  
 26.39 kg/m 2 1.73 m 2 Preferred Pharmacy Pharmacy Name Phone [address], [address] [place] [address] [phone] Your Updated Medication List  
  
   
This list is accurate as of 3/21/18 12:04 PM.  Always use your most recent med list. amLODIPine 5 mg tablet Commonly known as:  Genesis Valera Take 2.5 mg by mouth daily. aspirin 81 mg chewable tablet Take 1 Tab by mouth daily. CLARITIN 10 mg tablet Generic drug:  loratadine Take 10 mg by mouth. hydrALAZINE 25 mg tablet Commonly known as:  APRESOLINE Take 1 Tab by mouth three (3) times daily. lovastatin 20 mg tablet Commonly known as:  MEVACOR Take 20 mg by mouth nightly. Indications: takes 1/2 tab MUCINEX 600 mg ER tablet Generic drug:  guaiFENesin ER Take 600 mg by mouth two (2) times a day. SINGULAIR 10 mg tablet Generic drug:  montelukast  
Take 10 mg by mouth nightly. traZODone 50 mg tablet Commonly known as:  Soheila Lie Take 50 mg by mouth nightly. TYLENOL 325 mg tablet Generic drug:  acetaminophen Take  by mouth every four (4) hours as needed for Pain. VITAMIN D3 1,000 unit Cap Generic drug:  cholecalciferol Take 2 Tabs by mouth daily. Follow-up Instructions Return in about 6 weeks (around 5/2/2018). To-Do List   
 03/21/2018 Lab:  [facility] COMPREHENSIVE PANEL   
  
 03/21/2018 Nursing:  PULSE OXIMETRY CONTINUOUS   
  
 03/28/2018 PFT:  PFT [facility] Introducing [facility]! Amada Lee introduces k[employer] patient portal. Now you can access parts of your medical record, email your doctor's office, and request medication refills online. 1. In your internet browser, go to [email or web address] 2. Click on the First Time User? Click Here link in the Sign In box. You will see the New Member Sign Up page. 3. Enter your k[employer]ify Access Code exactly as it appears below. You will not need to use this code after youve completed the sign-up process. If you do not sign up before the expiration date, you must request a new code. · k[employer] Access Code: [email or web address] Expires: 6/19/2018 11:39 AM 
 
4. Enter the last four digits of your Social Security Number (xxxx) and Date of Birth (mm/dd/yyyy) as indicated and click Submit. You will be taken to the next sign-up page. 5. Create a klinify ID. This will be your klinify login ID and cannot be changed, so think of one that is secure and easy to remember. 6. Create a k[employer]ify password. You can change your password at any time. 7. Enter your Password Reset Question and Answer. This can be used at a later time if you forget your password. 8. Enter your e-mail address. You will receive e-mail notification when new information is available in [address] 9. Click Sign Up. You can now view and download portions of your medical record. 10. Click the Download Summary menu link to download a portable copy of your medical information. If you have questions, please visit the Frequently Asked Questions section of the k[employer]ify website. Remember, k[employer]ify is NOT to be used for urgent needs. For medical emergencies, dial 911. Now available from your iPhone and Android! Please provide this summary of care documentation to your next provider. Your primary care clinician is listed as Cody Rivas. If you have any questions after today's visit, please call [phone].

## 2018-03-21 NOTE — LETTER
Kierra Hensley 1940 
 
3/21/2018 Dear Heavenly Mary MD 
 
I had the pleasure of evaluating  Ms. Freedman in office today. Below are the relevant portions of my assessment and plan of care. ICD-10-CM ICD-9-CM 1. Pulmonary HTN I27.20 416.8 PFT DLCO  
   ROBERTO COMPREHENSIVE PANEL  
   PULSE OXIMETRY CONTINUOUS  
 severe ? etiology 
has bronchitis 2. Essential hypertension I10 401.9 3. Hyperlipidemia, unspecified hyperlipidemia type E78.5 272.4 4. Syncope, unspecified syncope type R55 780.2   
 likely orthostatic 5. Chronic kidney disease, unspecified CKD stage N18.9 585.9 Current Outpatient Prescriptions Medication Sig Dispense Refill  guaiFENesin ER (MUCINEX) 600 mg ER tablet Take 600 mg by mouth two (2) times a day.  aspirin 81 mg chewable tablet Take 1 Tab by mouth daily. 30 Tab 0  
 hydrALAZINE (APRESOLINE) 25 mg tablet Take 1 Tab by mouth three (3) times daily. 90 Tab 0  
 amLODIPine (NORVASC) 5 mg tablet Take 2.5 mg by mouth daily.  loratadine (CLARITIN) 10 mg tablet Take 10 mg by mouth.  acetaminophen (TYLENOL) 325 mg tablet Take  by mouth every four (4) hours as needed for Pain.  traZODone (DESYREL) 50 mg tablet Take 50 mg by mouth nightly.  lovastatin (MEVACOR) 20 mg tablet Take 20 mg by mouth nightly. Indications: takes 1/2 tab  montelukast (SINGULAIR) 10 mg tablet Take 10 mg by mouth nightly.  Cholecalciferol, Vitamin D3, (VITAMIN D3) 1,000 unit cap Take 2 Tabs by mouth daily. Orders Placed This Encounter  ROBERTO COMPREHENSIVE PANEL Standing Status:   Future Standing Expiration Date:   3/22/2019  PULSE OXIMETRY CONTINUOUS Standing Status:   Future Standing Expiration Date:   3/21/2019  PFT DLCO Standing Status:   Future Standing Expiration Date:   9/18/2018  
 guaiFENesin ER (MUCINEX) 600 mg ER tablet Sig: Take 600 mg by mouth two (2) times a day. If you have questions, please do not hesitate to call me. I look forward to following Ms. Freedman along with you. Sincerely, Yenifer Hood MD

## 2018-03-21 NOTE — PROGRESS NOTES
HISTORY OF PRESENT ILLNESS  Jae Elaine is a 65 y.o. female. Hospital Follow Up   The history is provided by the patient. Associated symptoms include shortness of breath. Pertinent negatives include no chest pain, no abdominal pain and no headaches. Loss of Consciousness   The history is provided by the patient. This is a new problem. The problem occurs constantly. The problem has been resolved. Associated symptoms include shortness of breath. Pertinent negatives include no chest pain, no abdominal pain and no headaches. Nothing aggravates the symptoms. Nothing relieves the symptoms. Review of Systems   Constitutional: Negative for chills and fever. HENT: Negative for nosebleeds. Eyes: Negative for blurred vision and double vision. Respiratory: Positive for shortness of breath. Negative for cough, hemoptysis, sputum production and wheezing. Cardiovascular: Positive for leg swelling. Negative for chest pain, palpitations, orthopnea, claudication and PND. Gastrointestinal: Negative for abdominal pain, heartburn, nausea and vomiting. Musculoskeletal: Negative for myalgias. Skin: Negative for rash. Neurological: Positive for loss of consciousness. Negative for dizziness, weakness and headaches. Endo/Heme/Allergies: Does not bruise/bleed easily.      Family History   Problem Relation Age of Onset    Hypertension Other      parent and sibling    Stroke Other     Heart Disease Other 48     sibling    Cancer Sister      Liver    Cancer Sister      Liver    Breast Cancer Maternal Aunt     Breast Cancer Paternal Aunt        Past Medical History:   Diagnosis Date    Arthritis     Back pain     Back problem     Bursitis, trochanteric     Cancer ([address]     left breast cancer-left mastectomy    Fibromyalgia     Groin pain     Hyperlipemia     Hypertension 9/13/2010    Nausea & vomiting     Numbness April 2008    Left side    Renal mass, right 6/4/14    Sciatica     SI (sacroiliac) joint dysfunction     Small kidney     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    HX ORTHOPAEDIC Right 06/24/2003    carpel tunnel surgery    HX ORTHOPAEDIC Left 4/2004    carpel tunnel surgery       Social History   Substance Use Topics    Smoking status: Never Smoker    Smokeless tobacco: Never Used      Comment: no    Alcohol use No       Allergies   Allergen Reactions    Darvocet A500 [Propoxyphene N-Acetaminophen] Nausea Only    Iodine Other (comments)    Orudis [Ketoprofen] Nausea Only    Other Medication Nausea Only     oridus    Vicodin [Hydrocodone-Acetaminophen] Nausea Only       Prior to Admission medications    Medication Sig Start Date End Date Taking? Authorizing Provider   guaiFENesin ER (MUCINEX) 600 mg ER tablet Take 600 mg by mouth two (2) times a day. Yes Historical Provider   aspirin 81 mg chewable tablet Take 1 Tab by mouth daily. 3/10/18  Yes Stephan Mark MD   hydrALAZINE (APRESOLINE) 25 mg tablet Take 1 Tab by mouth three (3) times daily. 3/9/18  Yes Stephan Mark MD   amLODIPine (NORVASC) 5 mg tablet Take 2.5 mg by mouth daily. Yes Historical Provider   loratadine (CLARITIN) 10 mg tablet Take 10 mg by mouth. Yes Historical Provider   acetaminophen (TYLENOL) 325 mg tablet Take  by mouth every four (4) hours as needed for Pain. Yes Historical Provider   traZODone (DESYREL) 50 mg tablet Take 50 mg by mouth nightly.    Yes Historical Provider   lovastatin (MEVACOR) 20 mg tablet Take 20 mg by mouth nightly. Indications: takes 1/2 tab   Yes Historical Provider   montelukast (SINGULAIR) 10 mg tablet Take 10 mg by mouth nightly. Historical Provider   Cholecalciferol, Vitamin D3, (VITAMIN D3) 1,000 unit cap Take 2 Tabs by mouth daily. Historical Provider         Visit Vitals    /64    Pulse 73    Ht 5' 3\" (1.6 m)    Wt 67.6 kg (149 lb)    BMI 26.39 kg/m2         Physical Exam   Constitutional: She is oriented to person, place, and time. She appears well-developed and well-nourished. HENT:   Head: Normocephalic and atraumatic. Eyes: Conjunctivae are normal.   Neck: Neck supple. No JVD present. No tracheal deviation present. No thyromegaly present. Cardiovascular: Normal rate and regular rhythm. PMI is not displaced. Exam reveals no gallop, no S3 and no decreased pulses. Murmur heard. Holosystolic murmur is present  at the lower left sternal border  Pulmonary/Chest: No respiratory distress. She has no wheezes. She has no rales. She exhibits no tenderness. Abdominal: Soft. There is no tenderness. Musculoskeletal: She exhibits no edema. Neurological: She is alert and oriented to person, place, and time. Skin: Skin is warm. Psychiatric: She has a normal mood and affect. Ms. Inder Watson has a reminder for a \"due or due soon\" health maintenance. I have asked that she contact her primary care provider for follow-up on this health maintenance. SUMMARY:2/2018  Left ventricle: The ventricle was mildly dilated. Systolic function was   normal by visual assessment. Ejection fraction  was estimated to be 65 %. No obvious wall motion abnormalities identified in   the views obtained. Features were  consistent with a pseudonormal left ventricular filling pattern, with   concomitant abnormal relaxation and increased  filling pressure (grade 2 diastolic dysfunction). Right ventricle: The ventricle was dilated. Systolic pressure was moderately   increased.  Estimated peak pressure was 70  mmHg. Left atrium: The atrium was mildly dilated. Tricuspid valve: There was severe regurgitation      Assessment         ICD-10-CM ICD-9-CM    1. Pulmonary HTN I27.20 416.8 PFT DLCO      ROBERTO COMPREHENSIVE PANEL      PULSE OXIMETRY CONTINUOUS    severe ? etiology  has bronchitis   2. Essential hypertension I10 401.9    3. Hyperlipidemia, unspecified hyperlipidemia type E78.5 272.4    4. Syncope, unspecified syncope type R55 780.2     likely orthostatic      5. Chronic kidney disease, unspecified CKD stage N18.9 585.9    3/2018  pulm htn-? Etiology  Start work up  Cath based on initial findings    There are no discontinued medications. Orders Placed This Encounter    ROBERTO COMPREHENSIVE PANEL     Standing Status:   Future     Standing Expiration Date:   3/22/2019    PULSE OXIMETRY CONTINUOUS     Standing Status:   Future     Standing Expiration Date:   3/21/2019    PFT DLCO     Standing Status:   Future     Standing Expiration Date:   9/18/2018       Follow-up Disposition:  Return in about 6 weeks (around 5/2/2018).

## 2018-03-24 NOTE — DISCHARGE SUMMARY
Discharge Summary    Patient: Genny Cueva MRN: [id number]  CSN: [id number]    YOB: 1940  Age: 65 y.o. Sex: female    DOA: 3/7/2018 LOS:  LOS: 1 day   Discharge Date: 3/9/2018     Admission Diagnoses:  Bradycardia    Discharge Diagnoses:    Syncope  Bradycardia  HTN  Grade 2 diastolic dysfunction  Severe tricuspid regurgitation       Discharge Condition: Stable    PHYSICAL EXAM  Visit Vitals    /73    Pulse (!) 57    Temp 97.7 °F (36.5 °C)    Resp 16    Ht 5' 3\" (1.6 m)    Wt 66 kg (145 lb 8 oz)    SpO2 97%    Breastfeeding No    BMI 25.77 kg/m2       General: In NAD. HEENT: NCAT. Sclerae anicteric. Lungs:  Clear, no wheezes. Effort nonlabored. Heart:  Regular, mildly bradycardic. Abdomen: Soft, NTTP. Extremities: Clear, no wheezes. Psych:   Mood normal, good insight. Neurologic:  Awake and alert, motor nonfocal.    Hospital Course:   See admission H&P for full details of HPI. Patient admitted to telemetry bed from ED with cardiology in consultation. IVF were administered and orthostatic BPs were checked - patient noted to be orthostatic. There was no recommendation for pacer implantation at this time. Antihypertensive regimen has been adjusted and compression stockings were recommended. Patient has been without any other acute issues and is felt to be medically stable for discharge home with outpatient follow up as advised. Consults:   Cardiology    Significant Diagnostic Studies:  2D echo:  SUMMARY:  Left ventricle: The ventricle was mildly dilated. Systolic function was   normal by visual assessment. Ejection fraction  was estimated to be 65 %. No obvious wall motion abnormalities identified in   the views obtained. Features were  consistent with a pseudonormal left ventricular filling pattern, with   concomitant abnormal relaxation and increased  filling pressure (grade 2 diastolic dysfunction). Right ventricle: The ventricle was dilated.  Systolic pressure was moderately   increased. Estimated peak pressure was 70  mmHg. Left atrium: The atrium was mildly dilated. Tricuspid valve: There was severe regurgitation. CT head:  Impression:     1. No acute intracranial pathology. 2. Small left parietal scalp contusion without skull fracture. CXR:  IMPRESSION:      Stable cardiomegaly. No acute pulmonary process. Discharge Medications:     Discharge Medication List as of 3/9/2018  1:53 PM      START taking these medications    Details   aspirin 81 mg chewable tablet Take 1 Tab by mouth daily. , Print, Disp-30 Tab, R-0      hydrALAZINE (APRESOLINE) 25 mg tablet Take 1 Tab by mouth three (3) times daily. , Print, Disp-90 Tab, R-0         CONTINUE these medications which have NOT CHANGED    Details   amLODIPine (NORVASC) 5 mg tablet Take 5 mg by mouth daily. , Historical Med      loratadine (CLARITIN) 10 mg tablet Take 10 mg by mouth., Historical Med      acetaminophen (TYLENOL) 325 mg tablet Take  by mouth every four (4) hours as needed for Pain., Historical Med      montelukast (SINGULAIR) 10 mg tablet Take 10 mg by mouth nightly., Historical Med      traZODone (DESYREL) 50 mg tablet Take 50 mg by mouth nightly., Historical Med      lovastatin (MEVACOR) 20 mg tablet Take 20 mg by mouth nightly. Indications: takes 1/2 tab, Historical Med      Cholecalciferol, Vitamin D3, (VITAMIN D3) 1,000 unit cap Take 2 Tabs by mouth daily. , Historical Med         STOP taking these medications       metoprolol succinate (TOPROL-XL) 50 mg XL tablet Comments:   Reason for Stopping:                 Activity: As tolerated    Diet: Cardiac    Follow-up: with PCP, Caitlyn Cervantes MD in 1 week. Reji Escalera.  Myra Willingham MD  [facility]

## 2018-04-02 ENCOUNTER — HOSPITAL ENCOUNTER (OUTPATIENT)
Dept: RESPIRATORY THERAPY | Age: 78
Discharge: HOME | End: 2018-04-02
Attending: INTERNAL MEDICINE
Payer: MEDICARE

## 2018-04-02 ENCOUNTER — HOSPITAL ENCOUNTER (OUTPATIENT)
Dept: LAB | Age: 78
Discharge: HOME | End: 2018-04-02
Attending: INTERNAL MEDICINE
Payer: MEDICARE

## 2018-04-02 DIAGNOSIS — I27.20 PULMONARY HTN (HCC): ICD-10-CM

## 2018-04-02 PROCEDURE — 94726 PLETHYSMOGRAPHY LUNG VOLUMES: CPT

## 2018-04-02 PROCEDURE — 86225 DNA ANTIBODY NATIVE: CPT | Performed by: INTERNAL MEDICINE

## 2018-04-02 PROCEDURE — 94060 EVALUATION OF WHEEZING: CPT

## 2018-04-02 PROCEDURE — 36415 COLL VENOUS BLD VENIPUNCTURE: CPT | Performed by: INTERNAL MEDICINE

## 2018-04-02 PROCEDURE — 94729 DIFFUSING CAPACITY: CPT

## 2018-04-03 LAB
CENTROMERE B AB SER-ACNC: <0.2 AI (ref 0–0.9)
CHROMATIN AB SERPL-ACNC: 0.2 AI (ref 0–0.9)
DSDNA AB SER-ACNC: 1 IU/ML (ref 0–9)
ENA JO1 AB SER-ACNC: <0.2 AI (ref 0–0.9)
ENA RNP AB SER-ACNC: <0.2 AI (ref 0–0.9)
ENA SCL70 AB SER-ACNC: <0.2 AI (ref 0–0.9)
ENA SM AB SER-ACNC: <0.2 AI (ref 0–0.9)
ENA SS-A AB SER-ACNC: <0.2 AI (ref 0–0.9)
ENA SS-B AB SER-ACNC: <0.2 AI (ref 0–0.9)
SEE BELOW, 164869: NORMAL

## 2018-04-10 ENCOUNTER — TELEPHONE (OUTPATIENT)
Dept: CARDIOLOGY CLINIC | Age: 78
End: 2018-04-10

## 2018-04-19 ENCOUNTER — TELEPHONE (OUTPATIENT)
Dept: CARDIOLOGY CLINIC | Age: 78
End: 2018-04-19

## 2018-04-19 ENCOUNTER — HOSPITAL ENCOUNTER (OUTPATIENT)
Dept: LAB | Age: 78
Discharge: HOME | End: 2018-04-19
Payer: MEDICARE

## 2018-04-19 DIAGNOSIS — N28.89 RENAL MASS: ICD-10-CM

## 2018-04-19 DIAGNOSIS — N27.0 SMALL KIDNEY, UNILATERAL: ICD-10-CM

## 2018-04-19 DIAGNOSIS — N25.81 SECONDARY HYPERPARATHYROIDISM OF RENAL ORIGIN (HCC): ICD-10-CM

## 2018-04-19 DIAGNOSIS — I10 HTN (HYPERTENSION): ICD-10-CM

## 2018-04-19 DIAGNOSIS — N18.4 CHRONIC KIDNEY DISEASE, STAGE IV (SEVERE) (HCC): ICD-10-CM

## 2018-04-19 DIAGNOSIS — E21.3 HYPERPARATHYROIDISM, UNSPECIFIED (HCC): ICD-10-CM

## 2018-04-19 DIAGNOSIS — E78.5 HLD (HYPERLIPIDEMIA): ICD-10-CM

## 2018-04-19 LAB
25(OH)D3 SERPL-MCNC: 15.1 NG/ML (ref 30–100)
ALBUMIN SERPL-MCNC: 4.1 G/DL (ref 3.4–5)
ANION GAP SERPL CALC-SCNC: 7 MMOL/L (ref 3–18)
BUN SERPL-MCNC: 19 MG/DL (ref 7–18)
BUN/CREAT SERPL: 12 (ref 12–20)
CALCIUM SERPL-MCNC: 9.7 MG/DL (ref 8.5–10.1)
CALCIUM SERPL-MCNC: 9.8 MG/DL (ref 8.5–10.1)
CHLORIDE SERPL-SCNC: 108 MMOL/L (ref 100–108)
CO2 SERPL-SCNC: 25 MMOL/L (ref 21–32)
CREAT SERPL-MCNC: 1.56 MG/DL (ref 0.6–1.3)
CREAT UR-MCNC: 75.4 MG/DL (ref 30–125)
GLUCOSE SERPL-MCNC: 98 MG/DL (ref 74–99)
HCT VFR BLD AUTO: 34.7 % (ref 35–45)
HGB BLD-MCNC: 11.4 G/DL (ref 12–16)
MICROALBUMIN UR-MCNC: 7.79 MG/DL (ref 0–3)
MICROALBUMIN/CREAT UR-RTO: 103 MG/G (ref 0–30)
PHOSPHATE SERPL-MCNC: 3.7 MG/DL (ref 2.5–4.9)
POTASSIUM SERPL-SCNC: 4.4 MMOL/L (ref 3.5–5.5)
PTH-INTACT SERPL-MCNC: 98 PG/ML (ref 18.4–88)
SODIUM SERPL-SCNC: 140 MMOL/L (ref 136–145)

## 2018-04-19 PROCEDURE — 80069 RENAL FUNCTION PANEL: CPT | Performed by: INTERNAL MEDICINE

## 2018-04-19 PROCEDURE — 82306 VITAMIN D 25 HYDROXY: CPT | Performed by: INTERNAL MEDICINE

## 2018-04-19 PROCEDURE — 36415 COLL VENOUS BLD VENIPUNCTURE: CPT | Performed by: INTERNAL MEDICINE

## 2018-04-19 PROCEDURE — 83970 ASSAY OF PARATHORMONE: CPT | Performed by: INTERNAL MEDICINE

## 2018-04-19 PROCEDURE — 82043 UR ALBUMIN QUANTITATIVE: CPT | Performed by: INTERNAL MEDICINE

## 2018-04-19 PROCEDURE — 85018 HEMOGLOBIN: CPT | Performed by: INTERNAL MEDICINE

## 2018-04-19 NOTE — LETTER
4/20/2018 12:52 PM 
 
Ms. Elizalde Husbands [address] [address] [facility] Dear Ms. Freedman: 
 
We've missed you! Please call our office at [phone] to discuss the results of your Pulmonary Function Test, and treatment plan. Sincerely, Jamila Clarke MD

## 2018-04-19 NOTE — TELEPHONE ENCOUNTER
----- Message from Cynthia Vick MD sent at 4/16/2018  7:39 AM EDT -----  Abnormal pft  Refer to pulmonary

## 2018-05-02 ENCOUNTER — OFFICE VISIT (OUTPATIENT)
Dept: CARDIOLOGY CLINIC | Age: 78
End: 2018-05-02

## 2018-05-02 VITALS
WEIGHT: 149 LBS | DIASTOLIC BLOOD PRESSURE: 60 MMHG | SYSTOLIC BLOOD PRESSURE: 143 MMHG | HEART RATE: 59 BPM | BODY MASS INDEX: 26.4 KG/M2 | HEIGHT: 63 IN

## 2018-05-02 DIAGNOSIS — I10 ESSENTIAL HYPERTENSION: Primary | ICD-10-CM

## 2018-05-02 DIAGNOSIS — I27.20 PULMONARY HTN (HCC): ICD-10-CM

## 2018-05-02 DIAGNOSIS — R55 SYNCOPE, UNSPECIFIED SYNCOPE TYPE: ICD-10-CM

## 2018-05-02 DIAGNOSIS — N18.9 CHRONIC KIDNEY DISEASE, UNSPECIFIED CKD STAGE: ICD-10-CM

## 2018-05-02 NOTE — PROGRESS NOTES
HISTORY OF PRESENT ILLNESS  Samy Hayes is a 65 y.o. female. HPI Comments: Patient is here for follow up of diagnostic tests. Results will be discussed. Hypertension   The history is provided by the patient. This is a chronic problem. The problem occurs constantly. The problem has not changed since onset. Pertinent negatives include no chest pain, no abdominal pain, no headaches and no shortness of breath. Loss of Consciousness   The history is provided by the patient. This is a new problem. The problem occurs constantly. The problem has been resolved. Pertinent negatives include no chest pain, no abdominal pain, no headaches and no shortness of breath. Nothing aggravates the symptoms. Nothing relieves the symptoms. Review of Systems   Constitutional: Negative for chills and fever. HENT: Negative for nosebleeds. Eyes: Negative for blurred vision and double vision. Respiratory: Negative for cough, hemoptysis, sputum production, shortness of breath and wheezing. Cardiovascular: Positive for leg swelling. Negative for chest pain, palpitations, orthopnea, claudication and PND. Gastrointestinal: Negative for abdominal pain, heartburn, nausea and vomiting. Musculoskeletal: Negative for myalgias. Skin: Negative for rash. Neurological: Positive for loss of consciousness. Negative for dizziness, weakness and headaches. Endo/Heme/Allergies: Does not bruise/bleed easily.      Family History   Problem Relation Age of Onset    Hypertension Other      parent and sibling    Stroke Other     Heart Disease Other 48     sibling    Cancer Sister      Liver    Cancer Sister      Liver    Breast Cancer Maternal Aunt     Breast Cancer Paternal Aunt        Past Medical History:   Diagnosis Date    Arthritis     Back pain     Back problem     Bursitis, trochanteric     Cancer ([facility] [address]     left breast cancer-left mastectomy    Fibromyalgia     Groin pain     Hyperlipemia     Hypertension 9/13/2010    Nausea & vomiting     Numbness April 2008    Left side    Renal mass, right 6/4/14    Sciatica     SI (sacroiliac) joint dysfunction     Small kidney     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, [facility]    HX ORTHOPAEDIC Right 06/24/2003    carpel tunnel surgery    HX ORTHOPAEDIC Left 4/2004    carpel tunnel surgery       Social History   Substance Use Topics    Smoking status: Never Smoker    Smokeless tobacco: Never Used      Comment: no    Alcohol use No       Allergies   Allergen Reactions    Darvocet A500 [Propoxyphene N-Acetaminophen] Nausea Only    Iodine Other (comments)    Orudis [Ketoprofen] Nausea Only    Other Medication Nausea Only     oridus    Vicodin [Hydrocodone-Acetaminophen] Nausea Only       Prior to Admission medications    Medication Sig Start Date End Date Taking? Authorizing Provider   guaiFENesin ER (MUCINEX) 600 mg ER tablet Take 600 mg by mouth two (2) times a day. Yes Historical Provider   aspirin 81 mg chewable tablet Take 1 Tab by mouth daily. 3/10/18  Yes Kathy Valencia MD   hydrALAZINE (APRESOLINE) 25 mg tablet Take 1 Tab by mouth three (3) times daily. 3/9/18  Yes Kathy Valencia MD   amLODIPine (NORVASC) 5 mg tablet Take 2.5 mg by mouth daily. Yes Historical Provider   loratadine (CLARITIN) 10 mg tablet Take 10 mg by mouth. Yes Historical Provider   acetaminophen (TYLENOL) 325 mg tablet Take  by mouth every four (4) hours as needed for Pain.    Yes Historical Provider montelukast (SINGULAIR) 10 mg tablet Take 10 mg by mouth nightly. Yes Historical Provider   Cholecalciferol, Vitamin D3, (VITAMIN D3) 1,000 unit cap Take 2 Tabs by mouth daily. Yes Historical Provider   traZODone (DESYREL) 50 mg tablet Take 50 mg by mouth nightly. Yes Historical Provider   lovastatin (MEVACOR) 20 mg tablet Take 20 mg by mouth nightly. Indications: takes 1/2 tab   Yes Historical Provider         Visit Vitals    /60    Pulse (!) 59    Ht 5' 3\" (1.6 m)    Wt 67.6 kg (149 lb)    BMI 26.39 kg/m2         Physical Exam   Constitutional: She is oriented to person, place, and time. She appears well-developed and well-nourished. HENT:   Head: Normocephalic and atraumatic. Eyes: Conjunctivae are normal.   Neck: Neck supple. No JVD present. No tracheal deviation present. No thyromegaly present. Cardiovascular: Normal rate and regular rhythm. PMI is not displaced. Exam reveals no gallop, no S3 and no decreased pulses. Murmur heard. Holosystolic murmur is present  at the lower left sternal border  Pulmonary/Chest: No respiratory distress. She has no wheezes. She has no rales. She exhibits no tenderness. Abdominal: Soft. There is no tenderness. Musculoskeletal: She exhibits no edema. Neurological: She is alert and oriented to person, place, and time. Skin: Skin is warm. Psychiatric: She has a normal mood and affect. Ms. Diann Mckinney has a reminder for a \"due or due soon\" health maintenance. I have asked that she contact her primary care provider for follow-up on this health maintenance. SUMMARY:2/2018  Left ventricle: The ventricle was mildly dilated. Systolic function was   normal by visual assessment. Ejection fraction  was estimated to be 65 %. No obvious wall motion abnormalities identified in   the views obtained.  Features were  consistent with a pseudonormal left ventricular filling pattern, with   concomitant abnormal relaxation and increased  filling pressure (grade 2 diastolic dysfunction). Right ventricle: The ventricle was dilated. Systolic pressure was moderately   increased. Estimated peak pressure was 70  mmHg. Left atrium: The atrium was mildly dilated. Tricuspid valve: There was severe regurgitation    Bronchodilator:pft-4/2018  No significant improvement with bronchodilator therapy    Diffusion:pft-  Abnormal Diffusion Capacity reduced to 70 % predicted  DLCO normalizes to alveolar volume    Impression:  Reduced diffusion capacity indicating a decrease in alveolar surface area for gas exchange    Comment:  Above pattern may be seen in Pulmonary HTN, early Interstitial Lung disease or extrapulmonary factors  I Have personally reviewed recent relevant labs available and discussed with patient  4/2018-joi negative  Assessment         ICD-10-CM ICD-9-CM    1. Essential hypertension I10 401.9     stable   2. Syncope, unspecified syncope type R55 780.2     no recurrence   3. Pulmonary HTN (HCC) I27.20 416.8     ? group 1  does not want rt heart cath  continue monitoring  asymptomatic   4. Chronic kidney disease, unspecified CKD stage N18.9 585.9    3/2018  pulm htn-? Etiology  Start work up  Cath based on initial findings  5/2018  Insurance disapprove o2 sat  pftt mildly abnormal  Does not want rt heart cath    There are no discontinued medications. No orders of the defined types were placed in this encounter. Follow-up Disposition:  Return in about 6 months (around 11/2/2018).

## 2018-05-02 NOTE — PROGRESS NOTES
Patient brought medications list    1. Have you been to the ER, urgent care clinic since your last visit? Hospitalized since your last visit? No    2. Have you seen or consulted any other health care providers outside of the [address] [place] [address] since your last visit? Include any pap smears or colon screening.  Yes Where: Nephrology Routine

## 2018-05-02 NOTE — LETTER
Debbi Romero 1940 5/2/2018 Dear Sharri Jose MD 
 
I had the pleasure of evaluating  Ms. Freedman in office today. Below are the relevant portions of my assessment and plan of care. ICD-10-CM ICD-9-CM 1. Essential hypertension I10 401.9   
 stable 2. Syncope, unspecified syncope type R55 780.2   
 no recurrence 3. Pulmonary HTN (HCC) I27.20 416.8   
 ? group 1 
does not want rt heart cath 
continue monitoring 
asymptomatic 4. Chronic kidney disease, unspecified CKD stage N18.9 585.9 Current Outpatient Prescriptions Medication Sig Dispense Refill  guaiFENesin ER (MUCINEX) 600 mg ER tablet Take 600 mg by mouth two (2) times a day.  aspirin 81 mg chewable tablet Take 1 Tab by mouth daily. 30 Tab 0  
 hydrALAZINE (APRESOLINE) 25 mg tablet Take 1 Tab by mouth three (3) times daily. 90 Tab 0  
 amLODIPine (NORVASC) 5 mg tablet Take 2.5 mg by mouth daily.  loratadine (CLARITIN) 10 mg tablet Take 10 mg by mouth.  acetaminophen (TYLENOL) 325 mg tablet Take  by mouth every four (4) hours as needed for Pain.  montelukast (SINGULAIR) 10 mg tablet Take 10 mg by mouth nightly.  Cholecalciferol, Vitamin D3, (VITAMIN D3) 1,000 unit cap Take 2 Tabs by mouth daily.  traZODone (DESYREL) 50 mg tablet Take 50 mg by mouth nightly.  lovastatin (MEVACOR) 20 mg tablet Take 20 mg by mouth nightly. Indications: takes 1/2 tab No orders of the defined types were placed in this encounter. If you have questions, please do not hesitate to call me. I look forward to following Ms. Freedman along with you. Sincerely, Dayday Barragan MD

## 2018-05-02 NOTE — MR AVS SNAPSHOT
[address] [facility] 
 
 
 [address] [facility], [address] [facility]ly [facility] [address] 
[phone] Patient: Bruno Alcocer MRN: [id number] [facility] Visit Information Date & Time Provider Department Dept. Phone Encounter #  
 5/2/2018 10:00 AM Iesha Lopez MD [facility] [address] [place] [address] [id number] Follow-up Instructions Return in about 6 months (around 11/2/2018). Your Appointments 5/23/2018 11:15 AM  
ESTABLISHED PATIENT with Caridad Lou MD  
[facility] ([facility]) Appt Note: Yearly RCC, Rev Imaging- to be done at [facility] [address] [place] [address]  
  
   
 [address] [facility] [address]  
  
    
 11/7/2018 10:45 AM  
Office Visit with Iesha Lopez MD  
[facility]) Appt Note: 6 months [address] [facility], [address] [facility] [address]  
[address], [address] [address] Upcoming Health Maintenance Date Due ZOSTER VACCINE AGE 60> 6/18/1999 GLAUCOMA SCREENING Q2Y 8/18/2004 Bone Densitometry (Dexa) Screening 8/18/2004 Pneumococcal 65+ High/Highest Risk (1 of 2 - PCV13) 8/18/2004 MEDICARE YEARLY EXAM 3/21/2018 Influenza Age 5 to Adult 8/1/2018 DTaP/Tdap/Td series (2 - Td) 3/7/2028 Allergies as of 5/2/2018  Review Complete On: 5/2/2018 By: Iesha Lopez MD  
  
 Severity Noted Reaction Type Reactions Darvocet A500 [Propoxyphene N-acetaminophen]    Nausea Only Iodine  03/07/2018    Other (comments) Orudis [Ketoprofen]  07/24/2014    Nausea Only Other Medication    Nausea Only  
 oridus Vicodin [Hydrocodone-acetaminophen]    Nausea Only Current Immunizations  Never Reviewed Name Date Tdap 3/7/2018 10:20 AM  
  
 Not reviewed this visit You Were Diagnosed With   
  
 Codes Comments Essential hypertension    -  Primary ICD-10-CM: I10 
ICD-9-CM: 401.9 stable Syncope, unspecified syncope type     ICD-10-CM: R55 
ICD-9-CM: 780.2 no recurrence Pulmonary HTN ([facility] [address]     ICD-10-CM: I27.20 ICD-9-CM: 416.8 ? group 1 
does not want rt heart cath 
continue monitoring 
asymptomatic Chronic kidney disease, unspecified CKD stage     ICD-10-CM: N18.9 ICD-9-CM: 189. 9 Vitals BP Pulse Height(growth percentile) Weight(growth percentile) BMI OB Status 143/60 (!) 59 5' 3\" (1.6 m) 149 lb (67.6 kg) 26.39 kg/m2 Hysterectomy Smoking Status Never Smoker Vitals History BMI and BSA Data Body Mass Index Body Surface Area  
 26.39 kg/m 2 1.73 m 2 Preferred Pharmacy Pharmacy Name Phone 500 [facility], [address] [place] [address] [phone] Your Updated Medication List  
  
   
This list is accurate as of 5/2/18 10:17 AM.  Always use your most recent med list. amLODIPine 5 mg tablet Commonly known as:  Tayla Chimes Take 2.5 mg by mouth daily. aspirin 81 mg chewable tablet Take 1 Tab by mouth daily. CLARITIN 10 mg tablet Generic drug:  loratadine Take 10 mg by mouth. hydrALAZINE 25 mg tablet Commonly known as:  APRESOLINE Take 1 Tab by mouth three (3) times daily. lovastatin 20 mg tablet Commonly known as:  MEVACOR Take 20 mg by mouth nightly. Indications: takes 1/2 tab MUCINEX 600 mg ER tablet Generic drug:  guaiFENesin ER Take 600 mg by mouth two (2) times a day. SINGULAIR 10 mg tablet Generic drug:  montelukast  
Take 10 mg by mouth nightly. traZODone 50 mg tablet Commonly known as:  Claudia Vickie Take 50 mg by mouth nightly. TYLENOL 325 mg tablet Generic drug:  acetaminophen Take  by mouth every four (4) hours as needed for Pain. VITAMIN D3 1,000 unit Cap Generic drug:  cholecalciferol Take 2 Tabs by mouth daily. Follow-up Instructions Return in about 6 months (around 11/2/2018). To-Do List   
 05/16/2018 10:00 AM  
  Appointment with [facility] CT RM 1 at [facility] RAD CT ([phone]) ORAL CONTRAST/PREP  No oral contrast is needed for this exam.  DIET RESTRICTIONS  Nothing to eat or drink, 4 hours prior to study  May have water to take meds  GENERAL INSTRUCTIONS  If you were given premedications for IV contrast to take prior to having your study, please arrange to have someone drive you to your appointment. If you have had a creatinine level drawn within the past 30 days, please bring most recent results to your appt. MEDICATIONS  Bring a complete list of all medications you are currently taking to include prescriptions, over-the-counter meds, herbals, vitamins & any dietary supplements. RELATED STUDY INFORMATION  Bring any films, CDs, and reports related with you on the day of your exam.  This only includes studies done outside of [address] [place] [address], [facility], Cipriano Castro , and [place]. QUESTIONS  Notify the CT Department if you have any questions concerning your study. Cipriano Castro 69 - [phone] [facility] - [phone] Introducing [facility] & HEALTH SERVICES! [employer] introduces AllTheRooms patient portal. Now you can access parts of your medical record, email your doctor's office, and request medication refills online. 1. In your internet browser, go to [email or web address] 2. Click on the First Time User? Click Here link in the Sign In box. You will see the New Member Sign Up page. 3. Enter your AllTheRooms Access Code exactly as it appears below. You will not need to use this code after youve completed the sign-up process. If you do not sign up before the expiration date, you must request a new code. · AllTheRooms Access Code: [email or web address] Expires: 6/19/2018 11:39 AM 
 
 4. Enter the last four digits of your Social Security Number (xxxx) and Date of Birth (mm/dd/yyyy) as indicated and click Submit. You will be taken to the next sign-up page. 5. Create a Emprego Ligado ID. This will be your [employer] login ID and cannot be changed, so think of one that is secure and easy to remember. 6. Create a Emp[employer] Lig[employer] password. You can change your password at any time. 7. Enter your Password Reset Question and Answer. This can be used at a later time if you forget your password. 8. Enter your e-mail address. You will receive e-mail notification when new information is available in [address] 9. Click Sign Up. You can now view and download portions of your medical record. 10. Click the Download Summary menu link to download a portable copy of your medical information. If you have questions, please visit the Frequently Asked Questions section of the [employer] website. Remember, [employer] is NOT to be used for urgent needs. For medical emergencies, dial 911. Now available from your iPhone and Android! Please provide this summary of care documentation to your next provider. Your primary care clinician is listed as Justin Red. If you have any questions after today's visit, please call [phone].

## 2018-05-16 ENCOUNTER — HOSPITAL ENCOUNTER (OUTPATIENT)
Dept: LAB | Age: 78
Discharge: HOME | End: 2018-05-16
Payer: MEDICARE

## 2018-05-16 ENCOUNTER — HOSPITAL ENCOUNTER (OUTPATIENT)
Dept: GENERAL RADIOLOGY | Age: 78
Discharge: HOME | End: 2018-05-16
Attending: UROLOGY
Payer: MEDICARE

## 2018-05-16 ENCOUNTER — HOSPITAL ENCOUNTER (OUTPATIENT)
Dept: CT IMAGING | Age: 78
Discharge: HOME | End: 2018-05-16
Attending: UROLOGY
Payer: MEDICARE

## 2018-05-16 DIAGNOSIS — N28.89 RENAL MASS: ICD-10-CM

## 2018-05-16 DIAGNOSIS — C64.1 RENAL CELL CARCINOMA, RIGHT (HCC): ICD-10-CM

## 2018-05-16 LAB
ALBUMIN SERPL-MCNC: 4.4 G/DL (ref 3.4–5)
ALBUMIN/GLOB SERPL: 1.4 {RATIO} (ref 0.8–1.7)
ALP SERPL-CCNC: 124 U/L (ref 45–117)
ALT SERPL-CCNC: 19 U/L (ref 13–56)
ANION GAP SERPL CALC-SCNC: 9 MMOL/L (ref 3–18)
AST SERPL-CCNC: 22 U/L (ref 15–37)
BILIRUB SERPL-MCNC: 0.7 MG/DL (ref 0.2–1)
BUN SERPL-MCNC: 18 MG/DL (ref 7–18)
BUN/CREAT SERPL: 11 (ref 12–20)
CALCIUM SERPL-MCNC: 9.8 MG/DL (ref 8.5–10.1)
CHLORIDE SERPL-SCNC: 106 MMOL/L (ref 100–108)
CO2 SERPL-SCNC: 26 MMOL/L (ref 21–32)
CREAT SERPL-MCNC: 1.71 MG/DL (ref 0.6–1.3)
ERYTHROCYTE [DISTWIDTH] IN BLOOD BY AUTOMATED COUNT: 12.3 % (ref 11.6–14.5)
GLOBULIN SER CALC-MCNC: 3.1 G/DL (ref 2–4)
GLUCOSE SERPL-MCNC: 108 MG/DL (ref 74–99)
HCT VFR BLD AUTO: 36 % (ref 35–45)
HGB BLD-MCNC: 11.9 G/DL (ref 12–16)
MCH RBC QN AUTO: 33 PG (ref 24–34)
MCHC RBC AUTO-ENTMCNC: 33.1 G/DL (ref 31–37)
MCV RBC AUTO: 99.7 FL (ref 74–97)
PLATELET # BLD AUTO: 205 K/UL (ref 135–420)
PMV BLD AUTO: 10.6 FL (ref 9.2–11.8)
POTASSIUM SERPL-SCNC: 4.4 MMOL/L (ref 3.5–5.5)
PROT SERPL-MCNC: 7.5 G/DL (ref 6.4–8.2)
RBC # BLD AUTO: 3.61 M/UL (ref 4.2–5.3)
SODIUM SERPL-SCNC: 141 MMOL/L (ref 136–145)
WBC # BLD AUTO: 5.1 K/UL (ref 4.6–13.2)

## 2018-05-16 PROCEDURE — 71046 X-RAY EXAM CHEST 2 VIEWS: CPT

## 2018-05-16 PROCEDURE — 74176 CT ABD & PELVIS W/O CONTRAST: CPT

## 2018-05-16 PROCEDURE — 36415 COLL VENOUS BLD VENIPUNCTURE: CPT | Performed by: UROLOGY

## 2018-05-16 PROCEDURE — 80053 COMPREHEN METABOLIC PANEL: CPT | Performed by: UROLOGY

## 2018-05-16 PROCEDURE — 85027 COMPLETE CBC AUTOMATED: CPT | Performed by: UROLOGY

## 2018-06-06 ENCOUNTER — HOSPITAL ENCOUNTER (OUTPATIENT)
Dept: VASCULAR SURGERY | Age: 78
Discharge: HOME | End: 2018-06-06
Attending: INTERNAL MEDICINE
Payer: MEDICARE

## 2018-06-06 DIAGNOSIS — R20.2 PARESTHESIA: ICD-10-CM

## 2018-06-06 PROCEDURE — 93880 EXTRACRANIAL BILAT STUDY: CPT

## 2018-06-06 NOTE — PROCEDURES
Sanya 1  *** FINAL REPORT ***    Name: Sofya Lambert  MRN: [id number]    Outpatient  : 18 Aug 1940  HIS Order #: [id number]  [id number] [facility] Visit #: [id number]  Date: 2018    TYPE OF TEST: Cerebrovascular Duplex    REASON FOR TEST    Right Carotid:-             Proximal               Mid                 Distal  cm/s  Systolic  Diastolic  Systolic  Diastolic  Systolic  Diastolic  CCA:     87.1      19.0       81.0      19.0       79.0      21.0  Bulb:  ECA:     83.0       9.0  ICA:     90.0      21.0      111.0      25.0       83.0      18.0  ICA/CCA:  1.3       1.3    ICA Stenosis: <50%    Right Vertebral:-  Finding: Antegrade  Sys:       81.0  Noemi:       23.0    Right Subclavian: Normal    Left Carotid:-            Proximal                Mid                 Distal  cm/s  Systolic  Diastolic  Systolic  Diastolic  Systolic  Diastolic  CCA:     01.4      12.0       85.0      18.0       81.0      19.0  Bulb:  ECA:     87.0      10.0  ICA:     69.0      17.0       84.0      17.0       96.0      24.0  ICA/CCA:  1.1       1.3    ICA Stenosis: <50%    Left Vertebral:-  Finding: Antegrade  Sys:       49.0  Noemi:       10.0    Left Subclavian: Normal    INTERPRETATION/FINDINGS  Duplex images were obtained using 2-D gray scale, color flow, and  spectral Doppler analysis. 1. Bilateral <50% stenosis of the internal carotid arteries. 2. No significant stenosis in the external carotid arteries  bilaterally. 3. Antegrade flow in both vertebral arteries. 4. Normal flow in both subclavian arteries. Plaque Morphology:  1. Hyperechoic plaque in the bulb and right ICA. 2. Hyperechoic plaque in the bulb and left ICA. ADDITIONAL COMMENTS    I have personally reviewed the data relevant to the interpretation of  this  study. TECHNOLOGIST: Jeannie Gates, [facility], RVT/  Signed: 2018 11:23 AM    PHYSICIAN: Ella Obrien MD  Signed: 2018 01:53 PM

## 2018-06-26 ENCOUNTER — HOSPITAL ENCOUNTER (OUTPATIENT)
Dept: NON INVASIVE DIAGNOSTICS | Age: 78
Discharge: HOME | End: 2018-06-26
Attending: PSYCHIATRY & NEUROLOGY
Payer: MEDICARE

## 2018-06-26 DIAGNOSIS — G40.019 LOCALIZATION-RELATED IDIOPATHIC EPILEPSY AND EPILEPTIC SYNDROMES WITH SEIZURES OF LOCALIZED ONSET, INTRACTABLE, WITHOUT STATUS EPILEPTICUS (HCC): ICD-10-CM

## 2018-06-26 PROCEDURE — 93226 XTRNL ECG REC<48 HR SCAN A/R: CPT

## 2018-07-19 ENCOUNTER — HOSPITAL ENCOUNTER (OUTPATIENT)
Dept: NEUROLOGY | Age: 78
Discharge: HOME | End: 2018-07-19
Attending: PSYCHIATRY & NEUROLOGY
Payer: MEDICARE

## 2018-07-19 ENCOUNTER — HOSPITAL ENCOUNTER (OUTPATIENT)
Dept: MRI IMAGING | Age: 78
Discharge: HOME | End: 2018-07-19
Attending: PSYCHIATRY & NEUROLOGY
Payer: MEDICARE

## 2018-07-19 DIAGNOSIS — G40.019 LOCALIZATION-RELATED IDIOPATHIC EPILEPSY AND EPILEPTIC SYNDROMES WITH SEIZURES OF LOCALIZED ONSET, INTRACTABLE, WITHOUT STATUS EPILEPTICUS (HCC): ICD-10-CM

## 2018-07-19 PROCEDURE — 70551 MRI BRAIN STEM W/O DYE: CPT

## 2018-07-19 PROCEDURE — 95819 EEG AWAKE AND ASLEEP: CPT

## 2018-07-24 NOTE — PROCEDURES
[facility]  EEG    Darylene Seip  MR#: [id number]  : 1940  ACCOUNT #: [de-identified]   DATE OF SERVICE: 2018    REFERRING:  Dr. Maritza Diamond. EEG NUMBER:  Not mentioned. CLINICAL:  This is an apparently wakeful EEG on this 65year-old patient being evaluated for complaint of weakness and numbness on the left side of her body. This apparently occurred after having a syncopal event. MEDICATIONS:  Include amlodipine, trazodone, lovastatin, aspirin, Tylenol, vitamin D, Claritin, and Mucinex. EEG REPORT:  The predominant apparently wakeful background consists of 15-25 microvolt, sinusoidal and symmetrical, 9-10 Hz waves, which attenuate well with eye opening. Bifrontally identified with some spread into the central and temporal head regions is the occurrence of 5-7 microvolt, 18-22 Hz waves. Step flash photic stimulation was performed that caused no driving response. IMPRESSION:  This is a normal wakeful EEG. No epileptiform or focal abnormality was seen.       MD BJ Foley / DELORIS  D: 2018 18:09     T: 2018 18:45  JOB #: [id number]  CC: [address] MD  [address] [place] [address], [address] [place] [address]

## 2018-08-29 ENCOUNTER — OFFICE VISIT (OUTPATIENT)
Dept: VASCULAR SURGERY | Age: 78
End: 2018-08-29

## 2018-08-29 VITALS
HEIGHT: 63 IN | SYSTOLIC BLOOD PRESSURE: 110 MMHG | DIASTOLIC BLOOD PRESSURE: 80 MMHG | WEIGHT: 150 LBS | BODY MASS INDEX: 26.58 KG/M2 | HEART RATE: 70 BPM | RESPIRATION RATE: 16 BRPM

## 2018-08-29 DIAGNOSIS — I65.23 CAROTID STENOSIS, ASYMPTOMATIC, BILATERAL: ICD-10-CM

## 2018-08-29 DIAGNOSIS — I73.9 PAD (PERIPHERAL ARTERY DISEASE) (HCC): ICD-10-CM

## 2018-08-29 DIAGNOSIS — I87.2 CHRONIC VENOUS INSUFFICIENCY: ICD-10-CM

## 2018-08-29 DIAGNOSIS — I83.93 VARICOSE VEINS OF BOTH LOWER EXTREMITIES: Primary | ICD-10-CM

## 2018-08-29 DIAGNOSIS — I87.303 CHRONIC VENOUS HYPERTENSION INVOLVING BOTH SIDES: ICD-10-CM

## 2018-08-29 NOTE — PROGRESS NOTES
1. Have you been to an emergency room or urgent care clinic since your last visit? no 
 
Hospitalized since your last visit? If yes, where, when, and reason for visit? no 
2. Have you seen or consulted any other health care providers outside of the [facility] since your last visit including any procedures, health maintenance items.  If yes, where, when and reason for visit? no

## 2018-08-29 NOTE — MR AVS SNAPSHOT
[address] 163 200 [facility] 
[phone] Patient: Verlyn Prader MRN: [id number] [facility] Visit Information Date & Time Provider Department Dept. Phone Encounter #  
 8/29/2018 11:15 AM Wilbert Pastrana MD [facility] [phone] Follow-up Instructions Return if symptoms worsen or fail to improve. Your Appointments 11/7/2018 10:45 AM  
Office Visit with Sharath Shukla MD  
[facility]) Appt Note: 6 months [address] [facility], [address] [facility] [address] [address], [address] [address]  
  
    
 5/22/2019  9:30 AM  
ESTABLISHED PATIENT with Nereyda Dasilva MD  
[facility] ([address]) Appt Note: 1yr F/U, Rev Imaging/Labs- to be done at [facility]  
 [address] [facility] 1097 [address] [place] [address] 100 200 [facility] Upcoming Health Maintenance Date Due ZOSTER VACCINE AGE 60> 6/18/1999 GLAUCOMA SCREENING Q2Y 8/18/2004 Bone Densitometry (Dexa) Screening 8/18/2004 Pneumococcal 65+ High/Highest Risk (1 of 2 - PCV13) 8/18/2004 MEDICARE YEARLY EXAM 3/21/2018 Influenza Age 5 to Adult 8/1/2018 DTaP/Tdap/Td series (2 - Td) 3/7/2028 Allergies as of 8/29/2018  Review Complete On: 8/29/2018 By: Wilbert Pastrana MD  
  
 Severity Noted Reaction Type Reactions Darvocet A500 [Propoxyphene N-acetaminophen]    Nausea Only Iodine  03/07/2018    Other (comments) Orudis [Ketoprofen]  07/24/2014    Nausea Only Other Medication    Nausea Only  
 oridus Oxycodone-acetaminophen  09/23/2015    Other (comments) Vicodin [Hydrocodone-acetaminophen]    Nausea Only Current Immunizations  Never Reviewed Name Date  Tdap 3/7/2018 10:20 AM  
  
 Not reviewed this visit You Were Diagnosed With   
  
 Codes Comments Varicose veins of both lower extremities    -  Primary ICD-10-CM: I83.93 
ICD-9-CM: 637. 9 Chronic venous insufficiency     ICD-10-CM: I87.2 ICD-9-CM: 459.81 Chronic venous hypertension involving both sides     ICD-10-CM: I87.303 ICD-9-CM: 459.30 PAD (peripheral artery disease) (HCC)     ICD-10-CM: I73.9 ICD-9-CM: 443.9 Carotid stenosis, asymptomatic, bilateral     ICD-10-CM: I65.23 ICD-9-CM: 433.10, 433.30 Vitals BP Pulse Resp Height(growth percentile) Weight(growth percentile) BMI  
 110/80 (BP 1 Location: Right arm, BP Patient Position: Sitting) 70 16 5' 3\" (1.6 m) 150 lb (68 kg) 26.57 kg/m2 OB Status Smoking Status Hysterectomy Never Smoker Vitals History BMI and BSA Data Body Mass Index Body Surface Area  
 26.57 kg/m 2 1.74 m 2 Preferred Pharmacy Pharmacy Name Phone 500 [address], [address] [place] [address] [phone] Your Updated Medication List  
  
   
This list is accurate as of 8/29/18 11:55 AM.  Always use your most recent med list. amLODIPine 5 mg tablet Commonly known as:  Arlyss Thomas Take 2.5 mg by mouth daily. aspirin 81 mg chewable tablet Take 1 Tab by mouth daily. CLARITIN 10 mg tablet Generic drug:  loratadine Take 10 mg by mouth. docusate sodium 100 mg capsule Commonly known as:  COLACE  
100 mg.  
  
 fluticasone 50 mcg/actuation nasal spray Commonly known as:  FLONASE  
1 Spray. hydrALAZINE 25 mg tablet Commonly known as:  APRESOLINE Take 1 Tab by mouth three (3) times daily. HYDROmorphone 2 mg tablet Commonly known as:  DILAUDID  
2 mg.  
  
 lovastatin 20 mg tablet Commonly known as:  MEVACOR Take 20 mg by mouth nightly. Indications: takes 1/2 tab  
  
 metoprolol tartrate 50 mg tablet Commonly known as:  LOPRESSOR 50 mg. MUCINEX 600 mg ER tablet Generic drug:  guaiFENesin ER Take 600 mg by mouth two (2) times a day. ondansetron 4 mg disintegrating tablet Commonly known as:  ZOFRAN ODT  
4 mg. SINGULAIR 10 mg tablet Generic drug:  montelukast  
Take 10 mg by mouth nightly. traZODone 50 mg tablet Commonly known as:  Madolyn Saas Take 50 mg by mouth nightly. TYLENOL 325 mg tablet Generic drug:  acetaminophen Take  by mouth every four (4) hours as needed for Pain. VITAMIN D3 1,000 unit Cap Generic drug:  cholecalciferol Take 2 Tabs by mouth daily. Follow-up Instructions Return if symptoms worsen or fail to improve. Introducing [facility] & HEALTH SERVICES! [employer] introduces imedo patient portal. Now you can access parts of your medical record, email your doctor's office, and request medication refills online. 1. In your internet browser, go to [email or web address] 2. Click on the First Time User? Click Here link in the Sign In box. You will see the New Member Sign Up page. 3. Enter your imedo Access Code exactly as it appears below. You will not need to use this code after youve completed the sign-up process. If you do not sign up before the expiration date, you must request a new code. · imedo Access Code: [email or web address] Expires: 9/18/2018 11:10 AM 
 
4. Enter the last four digits of your Social Security Number (xxxx) and Date of Birth (mm/dd/yyyy) as indicated and click Submit. You will be taken to the next sign-up page. 5. Create a SportsHedget ID. This will be your imedo login ID and cannot be changed, so think of one that is secure and easy to remember. 6. Create a imedo password. You can change your password at any time. 7. Enter your Password Reset Question and Answer. This can be used at a later time if you forget your password. 8. Enter your e-mail address.  You will receive e-mail notification when new information is available in wunderloop. 9. Click Sign Up. You can now view and download portions of your medical record. 10. Click the Download Summary menu link to download a portable copy of your medical information. If you have questions, please visit the Frequently Asked Questions section of the wunderloop website. Remember, wunderloop is NOT to be used for urgent needs. For medical emergencies, dial 911. Now available from your iPhone and Android! Please provide this summary of care documentation to your next provider. Your primary care clinician is listed as Negra Lucia. If you have any questions after today's visit, please call [phone].

## 2018-08-29 NOTE — PROGRESS NOTES
Em Siegel Chief Complaint Patient presents with  New Patient  Numbness HPI Em Siegel is a 77 y.o. female with numbness and tingling of bilateral lower extremities. She states that she does get a lot of numbness within both legs. This seems to be constant all the time. When she wakes up in the morning she may not feel it right away but then when she wiggles her ankles while still laying down she can get a lot of numbness and some electrical pains going down to her feet. Seems to be originating from the knee on the right side and on the left side it can be posterior from the buttock area all the way down to her toes. She does state that she gets some swelling this seems to be worse at the end of the day but is not always constant. She does have known chronic kidney disease as well as some heart issues but nothing horrific. She does not complain of any claudication or rest pain of bilateral lower extremities. She states that she is able to walk without too much difficulty but again continues with numbness and tingling of her bilateral lower extremities. No fevers or chills no previous known history of DVT or PE. She does have some varicose veins but they do not bother her or cause her any pain. No previous ulcerations or nonhealing wounds. Past Medical History:  
Diagnosis Date  Arthritis  Back pain  Back problem  Bursitis, trochanteric  Cancer [facility])   
 left breast cancer-left mastectomy  Fibromyalgia  Groin pain  Hyperlipemia  Hypertension 9/13/2010  Nausea & vomiting  Numbness April 2008 Left side  Renal mass, right 6/4/14  Sciatica  SI (sacroiliac) joint dysfunction  Small kidney Atrophic right kidney and densely calcified left renal artery Patient Active Problem List  
Diagnosis Code  Hypertension I10  Back problem M53.9  
 H/O left mastectomy Z90.12  
 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  
 Chronic venous insufficiency I87.2  Chronic venous hypertension involving both sides I87.303  PAD (peripheral artery disease) ([facility]) I73.9  Carotid stenosis, asymptomatic, bilateral I65.23 Past Surgical History:  
Procedure Laterality Date  HX BACK SURGERY  1988  
 lumbar sacral area  HX CATARACT REMOVAL Right 01/07/09  
 w/ lens implants  HX CATARACT REMOVAL Left 02/11/09  
 w/ lens implants  HX GI  10/85 Hemorrhoid surgery  HX GI  2009  
 colonoscopy [facility]  
 hysterectomy  HX HEENT  7/20/09 Tube in right ear  HX MASTECTOMY Left 4/70  HX NEPHRECTOMY Right 10/9/15 Open Partial, Dr. Anna Rebollar, [facility]  
 HX ORTHOPAEDIC Right 06/24/2003  
 carpel tunnel surgery  HX ORTHOPAEDIC Left 4/2004  
 carpel tunnel surgery  HX OTHER SURGICAL  03/2018  
 stabbles in her head fall on the floor Current Outpatient Prescriptions Medication Sig Dispense Refill  aspirin 81 mg chewable tablet Take 1 Tab by mouth daily. 30 Tab 0  
 amLODIPine (NORVASC) 5 mg tablet Take 2.5 mg by mouth daily.  loratadine (CLARITIN) 10 mg tablet Take 10 mg by mouth.  acetaminophen (TYLENOL) 325 mg tablet Take  by mouth every four (4) hours as needed for Pain.  montelukast (SINGULAIR) 10 mg tablet Take 10 mg by mouth nightly.  Cholecalciferol, Vitamin D3, (VITAMIN D3) 1,000 unit cap Take 2 Tabs by mouth daily.  traZODone (DESYREL) 50 mg tablet Take 50 mg by mouth nightly.  lovastatin (MEVACOR) 20 mg tablet Take 20 mg by mouth nightly. Indications: takes 1/2 tab  docusate sodium (COLACE) 100 mg capsule 100 mg.    
 fluticasone (FLONASE) 50 mcg/actuation nasal spray 1 Spray.  HYDROmorphone (DILAUDID) 2 mg tablet 2 mg.  metoprolol tartrate (LOPRESSOR) 50 mg tablet 50 mg.    
 ondansetron (ZOFRAN ODT) 4 mg disintegrating tablet 4 mg.  guaiFENesin ER (MUCINEX) 600 mg ER tablet Take 600 mg by mouth two (2) times a day.  hydrALAZINE (APRESOLINE) 25 mg tablet Take 1 Tab by mouth three (3) times daily. 90 Tab 0 Allergies Allergen Reactions  Darvocet A500 [Propoxyphene N-Acetaminophen] Nausea Only  Iodine Other (comments)  Orudis [Ketoprofen] Nausea Only  Other Medication Nausea Only  
  oridus  Oxycodone-Acetaminophen Other (comments)  Vicodin [Hydrocodone-Acetaminophen] Nausea Only Social History Social History  Marital status:  Spouse name: N/A  
 Number of children: N/A  
 Years of education: N/A Occupational History  Not on file. Social History Main Topics  Smoking status: Never Smoker  Smokeless tobacco: Never Used Comment: no  
 Alcohol use No  
 Drug use: No  
 Sexual activity: Yes  
  Partners: Male Other Topics Concern  Not on file Social History Narrative Family History Problem Relation Age of Onset  Hypertension Other   
  parent and sibling  Stroke Other  Heart Disease Other 50  
  sibling  Cancer Sister Liver  Cancer Sister Liver  Breast Cancer Maternal Aunt  Breast Cancer Paternal Aunt Review of Systems Constitutional: negative Eyes: negative Ears, nose, mouth, throat, and face: negative Respiratory: negative Cardiovascular: negative Gastrointestinal: negative Genitourinary:negative Hematologic/lymphatic: negative Musculoskeletal:negative Neurological: negative Behavioral/Psych: negative Endocrine: negative Allergic/Immunologic: negative Unless otherwise mentioned in the HPI. Physical Exam:   
Visit Vitals  /80 (BP 1 Location: Right arm, BP Patient Position: Sitting)  Pulse 70  Resp 16  
 Ht 5' 3\" (1.6 m)  Wt 150 lb (68 kg)  BMI 26.57 kg/m2 General: Well-appearing female in no acute distress HEENT: EOMI no scleral icterus is noted Pulmonary: No increased work of breathing is noted clear to auscultation bilaterally no wheezes rales rhonchi 
Cardiovascular: Regular rate rhythm normal S1-S2 no rubs murmurs or gallops no carotid bruits are heard bilaterally Abdomen: Soft nontender nondistended no rebound or guarding is noted no palpable pulsatile abdominal mass can be felt Extremities: Warm and well-perfused bilaterally 1+ DP pulses bilaterally unable to palpate her posterior tibial pulses probably secondary to the fact that she does have trace to 1+ edema bilateral lower extremities but she does have biphasic signals that are auscultated on Doppler in the office. That was bilaterally. Patient also has some hemosiderin deposits of bilateral dorsal feet and circumferentially around the ankle and mid calf. She has some minor varicosities that are identified bilaterally he seemed to be more reticular veins and spider veins that are identified. Otherwise no major ulcerations are identified bilaterally Neuro: Cranial nerves II through XII grossly intact Impression and Plan: 
Rachana Haywood is a 77 y.o. female who does have some peripheral arterial disease although I do not feel that she has anything that would be in need of repair seems much more likely that the patient has a peripheral neuropathy.   Patient does have history of back surgery I have recommended talking with her neurologist about all of her numbness and tingling. As far as the swelling that she has in her legs this is likely secondary to multifactorial disease processes and have recommended compression socks over-the-counter for treatment at this current time. Seeing how she does not have any pain or discomfort or any reason to move forward with the procedure I see no reason to move forward with ultrasounds at this time. We did go over all the problems that can happen with peripheral arterial disease as well as chronic venous insufficiency in 1 to give me a call. But at this current time I just do not see her having enough of a problem where she would require any further imaging to move forward with the procedure. I do feel that the majority of her problems as her peripheral neuropathy which could be a central neuropathy secondary to her previous back surgery but I would leave this up to the neurologist.  Patient will come back and call us on an as-needed basis. We reviewed the plan with the patient and the patient understands. We also gave the patient appropriate instructions on their disease process and when to call back. Follow-up Disposition: 
Return if symptoms worsen or fail to improve. Gifty Mcduffie MD 
 
PLEASE NOTE: 
This document has been produced using voice recognition software. Unrecognized errors in transcription may be present.

## 2018-11-07 ENCOUNTER — OFFICE VISIT (OUTPATIENT)
Dept: CARDIOLOGY CLINIC | Age: 78
End: 2018-11-07

## 2018-11-07 VITALS
WEIGHT: 153 LBS | SYSTOLIC BLOOD PRESSURE: 161 MMHG | DIASTOLIC BLOOD PRESSURE: 73 MMHG | HEIGHT: 63 IN | BODY MASS INDEX: 27.11 KG/M2 | HEART RATE: 63 BPM

## 2018-11-07 DIAGNOSIS — I10 ESSENTIAL HYPERTENSION: Primary | ICD-10-CM

## 2018-11-07 DIAGNOSIS — I27.20 PULMONARY HTN (HCC): ICD-10-CM

## 2018-11-07 DIAGNOSIS — R55 SYNCOPE, UNSPECIFIED SYNCOPE TYPE: ICD-10-CM

## 2018-11-07 DIAGNOSIS — E78.5 HYPERLIPIDEMIA, UNSPECIFIED HYPERLIPIDEMIA TYPE: ICD-10-CM

## 2018-11-07 RX ORDER — HYDROCHLOROTHIAZIDE 25 MG/1
25 TABLET ORAL DAILY
COMMUNITY

## 2018-11-07 NOTE — LETTER
Nieves Cousin 1940 11/7/2018 Dear MD Charlotte Landry MD 
 
I had the pleasure of evaluating  Ms. Freedman in office today. Below are the relevant portions of my assessment and plan of care. ICD-10-CM ICD-9-CM 1. Essential hypertension I10 401.9 Elevated blood pressure in office. Home readings are normal continue to monitor 2. Pulmonary HTN (HCC) I27.20 416.8 Group 1 pulmonary hypertension symptoms are stable continue monitoring 3. Syncope, unspecified syncope type R55 780.2 No recurrence. 4. Hyperlipidemia, unspecified hyperlipidemia type E78.5 272.4 Stable lab with PCP Current Outpatient Medications Medication Sig Dispense Refill  hydroCHLOROthiazide (HYDRODIURIL) 25 mg tablet Take 25 mg by mouth daily.  fluticasone (FLONASE) 50 mcg/actuation nasal spray 1 Faber.  guaiFENesin ER (MUCINEX) 600 mg ER tablet Take 600 mg by mouth two (2) times a day.  aspirin 81 mg chewable tablet Take 1 Tab by mouth daily. (Patient taking differently: Take 325 mg by mouth daily.) 30 Tab 0  
 amLODIPine (NORVASC) 5 mg tablet Take 2.5 mg by mouth daily.  loratadine (CLARITIN) 10 mg tablet Take 10 mg by mouth.  acetaminophen (TYLENOL) 325 mg tablet Take  by mouth every four (4) hours as needed for Pain.  montelukast (SINGULAIR) 10 mg tablet Take 10 mg by mouth nightly.  Cholecalciferol, Vitamin D3, (VITAMIN D3) 1,000 unit cap Take 2 Tabs by mouth daily.  traZODone (DESYREL) 50 mg tablet Take 50 mg by mouth nightly.  lovastatin (MEVACOR) 20 mg tablet Take 20 mg by mouth nightly. Indications: takes 1/2 tab    
 HYDROmorphone (DILAUDID) 2 mg tablet 2 mg. Orders Placed This Encounter  hydroCHLOROthiazide (HYDRODIURIL) 25 mg tablet Sig: Take 25 mg by mouth daily. If you have questions, please do not hesitate to call me. I look forward to following Ms. Freedman along with you.  
 
Sincerely, 
 Aracely Valdez MD

## 2018-11-07 NOTE — PROGRESS NOTES
Patient brought medications list    1. Have you been to the ER, urgent care clinic since your last visit? Hospitalized since your last visit? No    2. Have you seen or consulted any other health care providers outside of the [address] [place] [address] since your last visit? Include any pap smears or colon screening.  Yes Where: Vascular Reason for visit: Swelling

## 2018-11-07 NOTE — PROGRESS NOTES
HISTORY OF PRESENT ILLNESS  Sherryle Newport is a 77 y.o. female. Hypertension   The history is provided by the patient. This is a chronic problem. The problem occurs constantly. The problem has not changed since onset. Pertinent negatives include no chest pain, no abdominal pain, no headaches and no shortness of breath. Loss of Consciousness   The history is provided by the patient. This is a new problem. The problem occurs constantly. The problem has been resolved. Pertinent negatives include no chest pain, no abdominal pain, no headaches and no shortness of breath. Nothing aggravates the symptoms. Nothing relieves the symptoms. Review of Systems   Constitutional: Negative for chills and fever. HENT: Negative for nosebleeds. Eyes: Negative for blurred vision and double vision. Respiratory: Negative for cough, hemoptysis, sputum production, shortness of breath and wheezing. Cardiovascular: Positive for leg swelling. Negative for chest pain, palpitations, orthopnea, claudication and PND. Gastrointestinal: Negative for abdominal pain, heartburn, nausea and vomiting. Musculoskeletal: Negative for myalgias. Skin: Negative for rash. Neurological: Negative for dizziness, loss of consciousness, weakness and headaches. Endo/Heme/Allergies: Does not bruise/bleed easily.      Family History   Problem Relation Age of Onset    Hypertension Other         parent and sibling    Stroke Other     Heart Disease Other 48        sibling    Cancer Sister         Liver    Cancer Sister         Liver    Breast Cancer Maternal Aunt     Breast Cancer Paternal Aunt        Past Medical History:   Diagnosis Date    Arthritis     Back pain     Back problem     Bursitis, trochanteric     Cancer ([facility] [address]     left breast cancer-left mastectomy    Fibromyalgia     Groin pain     Hyperlipemia     Hypertension 9/13/2010    Nausea & vomiting     Numbness April 2008    Left side    Renal mass, right 6/4/14  Sciatica     SI (sacroiliac) joint dysfunction     Small kidney     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 Jayjay Valencia, [facility]    HX ORTHOPAEDIC Right 06/24/2003    carpel tunnel surgery    HX ORTHOPAEDIC Left 4/2004    carpel tunnel surgery    HX OTHER SURGICAL  03/2018    stabbles in her head fall on the floor       Social History     Tobacco Use    Smoking status: Never Smoker    Smokeless tobacco: Never Used    Tobacco comment: no   Substance Use Topics    Alcohol use: No       Allergies   Allergen Reactions    Darvocet A500 [Propoxyphene N-Acetaminophen] Nausea Only    Iodine Other (comments)    Orudis [Ketoprofen] Nausea Only    Other Medication Nausea Only     oridus    Oxycodone-Acetaminophen Other (comments)    Vicodin [Hydrocodone-Acetaminophen] Nausea Only       Prior to Admission medications    Medication Sig Start Date End Date Taking? Authorizing Provider   hydroCHLOROthiazide (HYDRODIURIL) 25 mg tablet Take 25 mg by mouth daily. Yes Provider, Historical   fluticasone (FLONASE) 50 mcg/actuation nasal spray 1 Spray. Yes Provider, Historical   guaiFENesin ER (MUCINEX) 600 mg ER tablet Take 600 mg by mouth two (2) times a day. Yes Provider, Historical   aspirin 81 mg chewable tablet Take 1 Tab by mouth daily. Patient taking differently: Take 325 mg by mouth daily. 3/10/18  Yes Olga Squires MD   amLODIPine (NORVASC) 5 mg tablet Take 2.5 mg by mouth daily.    Yes Provider, Historical   loratadine (CLARITIN) 10 mg tablet Take 10 mg by mouth. Yes Provider, Historical   acetaminophen (TYLENOL) 325 mg tablet Take  by mouth every four (4) hours as needed for Pain. Yes Provider, Historical   montelukast (SINGULAIR) 10 mg tablet Take 10 mg by mouth nightly. Yes Provider, Historical   Cholecalciferol, Vitamin D3, (VITAMIN D3) 1,000 unit cap Take 2 Tabs by mouth daily. Yes Provider, Historical   traZODone (DESYREL) 50 mg tablet Take 50 mg by mouth nightly. Yes Provider, Historical   lovastatin (MEVACOR) 20 mg tablet Take 20 mg by mouth nightly. Indications: takes 1/2 tab   Yes Provider, Historical   HYDROmorphone (DILAUDID) 2 mg tablet 2 mg. 10/19/15   Provider, Historical         Visit Vitals  /73   Pulse 63   Ht 5' 3\" (1.6 m)   Wt 69.4 kg (153 lb)   BMI 27.10 kg/m²         Physical Exam   Constitutional: She is oriented to person, place, and time. She appears well-developed and well-nourished. HENT:   Head: Normocephalic and atraumatic. Eyes: Conjunctivae are normal.   Neck: Neck supple. No JVD present. No tracheal deviation present. No thyromegaly present. Cardiovascular: Normal rate and regular rhythm. PMI is not displaced. Exam reveals no gallop, no S3 and no decreased pulses. Murmur heard. Holosystolic murmur is present at the lower left sternal border. Pulmonary/Chest: No respiratory distress. She has no wheezes. She has no rales. She exhibits no tenderness. Abdominal: Soft. There is no tenderness. Musculoskeletal: She exhibits no edema. Neurological: She is alert and oriented to person, place, and time. Skin: Skin is warm. Psychiatric: She has a normal mood and affect. Ms. Flor Lai has a reminder for a \"due or due soon\" health maintenance. I have asked that she contact her primary care provider for follow-up on this health maintenance. SUMMARY:2/2018  Left ventricle: The ventricle was mildly dilated. Systolic function was   normal by visual assessment.  Ejection fraction  was estimated to be 65 %. No obvious wall motion abnormalities identified in   the views obtained. Features were  consistent with a pseudonormal left ventricular filling pattern, with   concomitant abnormal relaxation and increased  filling pressure (grade 2 diastolic dysfunction). Right ventricle: The ventricle was dilated. Systolic pressure was moderately   increased. Estimated peak pressure was 70  mmHg. Left atrium: The atrium was mildly dilated. Tricuspid valve: There was severe regurgitation    Bronchodilator:pft-4/2018  No significant improvement with bronchodilator therapy    Diffusion:pft-  Abnormal Diffusion Capacity reduced to 70 % predicted  DLCO normalizes to alveolar volume    Impression:  Reduced diffusion capacity indicating a decrease in alveolar surface area for gas exchange    Comment:  Above pattern may be seen in Pulmonary HTN, early Interstitial Lung disease or extrapulmonary factors  I Have personally reviewed recent relevant labs available and discussed with patient  4/2018-joi negative  Assessment         ICD-10-CM ICD-9-CM    1. Essential hypertension I10 401.9     Elevated blood pressure in office. Home readings are normal continue to monitor   2. Pulmonary HTN (HCC) I27.20 416.8     Group 1 pulmonary hypertension symptoms are stable continue monitoring   3. Syncope, unspecified syncope type R55 780.2     No recurrence.    4. Hyperlipidemia, unspecified hyperlipidemia type E78.5 272.4     Stable lab with PCP   3/2018  pulm htn-? Etiology  Start work up  Cath based on initial findings  5/2018  Insurance disapprove o2 sat  pftt mildly abnormal  Does not want rt heart cath    Medications Discontinued During This Encounter   Medication Reason    docusate sodium (COLACE) 100 mg capsule Discontinued by Another Clinician    metoprolol tartrate (LOPRESSOR) 50 mg tablet Discontinued by Another Clinician    ondansetron (ZOFRAN ODT) 4 mg disintegrating tablet Discontinued by Another Clinician  hydrALAZINE (APRESOLINE) 25 mg tablet Discontinued by Another Clinician       No orders of the defined types were placed in this encounter. Follow-up Disposition:  Return in about 6 months (around 5/7/2019).

## 2019-03-12 ENCOUNTER — HOSPITAL ENCOUNTER (OUTPATIENT)
Dept: MAMMOGRAPHY | Age: 79
Discharge: HOME | End: 2019-03-12
Attending: INTERNAL MEDICINE
Payer: MEDICARE

## 2019-03-12 DIAGNOSIS — Z12.31 VISIT FOR SCREENING MAMMOGRAM: ICD-10-CM

## 2019-03-12 PROCEDURE — 77063 BREAST TOMOSYNTHESIS BI: CPT

## 2019-03-25 ENCOUNTER — HOSPITAL ENCOUNTER (OUTPATIENT)
Dept: LAB | Age: 79
Discharge: HOME | End: 2019-03-25
Payer: MEDICARE

## 2019-03-25 DIAGNOSIS — N28.89 RENAL MASS: ICD-10-CM

## 2019-03-25 DIAGNOSIS — N25.81 SECONDARY HYPERPARATHYROIDISM OF RENAL ORIGIN (HCC): ICD-10-CM

## 2019-03-25 DIAGNOSIS — N18.30 CHRONIC KIDNEY DISEASE, STAGE 3 (HCC): ICD-10-CM

## 2019-03-25 DIAGNOSIS — R80.9 MICROALBUMINURIA: ICD-10-CM

## 2019-03-25 DIAGNOSIS — E21.3 HYPERPARATHYROIDISM (HCC): ICD-10-CM

## 2019-03-25 DIAGNOSIS — E78.5 HLD (HYPERLIPIDEMIA): ICD-10-CM

## 2019-03-25 DIAGNOSIS — D64.9 ANEMIA: ICD-10-CM

## 2019-03-25 DIAGNOSIS — I12.9 BENIGN HYPERTENSIVE KIDNEY DISEASE WITH CHRONIC KIDNEY DISEASE STAGE I THROUGH STAGE IV, OR UNSPECIFIED(403.10): ICD-10-CM

## 2019-03-25 DIAGNOSIS — N27.0 UNILATERAL SMALL KIDNEY: ICD-10-CM

## 2019-03-25 LAB
25(OH)D3 SERPL-MCNC: 41.7 NG/ML (ref 30–100)
ALBUMIN SERPL-MCNC: 4.1 G/DL (ref 3.4–5)
ANION GAP SERPL CALC-SCNC: 5 MMOL/L (ref 3–18)
BUN SERPL-MCNC: 24 MG/DL (ref 7–18)
BUN/CREAT SERPL: 13 (ref 12–20)
CALCIUM SERPL-MCNC: 10.2 MG/DL (ref 8.5–10.1)
CALCIUM SERPL-MCNC: 9.9 MG/DL (ref 8.5–10.1)
CHLORIDE SERPL-SCNC: 98 MMOL/L (ref 100–108)
CO2 SERPL-SCNC: 31 MMOL/L (ref 21–32)
CREAT SERPL-MCNC: 1.79 MG/DL (ref 0.6–1.3)
CREAT UR-MCNC: 174 MG/DL (ref 30–125)
GLUCOSE SERPL-MCNC: 106 MG/DL (ref 74–99)
HCT VFR BLD AUTO: 37.1 % (ref 35–45)
HGB BLD-MCNC: 11.8 G/DL (ref 12–16)
MICROALBUMIN UR-MCNC: 6.55 MG/DL (ref 0–3)
MICROALBUMIN/CREAT UR-RTO: 38 MG/G (ref 0–30)
PHOSPHATE SERPL-MCNC: 3.2 MG/DL (ref 2.5–4.9)
POTASSIUM SERPL-SCNC: 3.5 MMOL/L (ref 3.5–5.5)
PTH-INTACT SERPL-MCNC: 100.4 PG/ML (ref 18.4–88)
SODIUM SERPL-SCNC: 134 MMOL/L (ref 136–145)

## 2019-03-25 PROCEDURE — 83970 ASSAY OF PARATHORMONE: CPT

## 2019-03-25 PROCEDURE — 80069 RENAL FUNCTION PANEL: CPT

## 2019-03-25 PROCEDURE — 36415 COLL VENOUS BLD VENIPUNCTURE: CPT

## 2019-03-25 PROCEDURE — 82043 UR ALBUMIN QUANTITATIVE: CPT

## 2019-03-25 PROCEDURE — 85018 HEMOGLOBIN: CPT

## 2019-03-25 PROCEDURE — 82306 VITAMIN D 25 HYDROXY: CPT

## 2019-04-16 ENCOUNTER — HOSPITAL ENCOUNTER (OUTPATIENT)
Dept: LAB | Age: 79
Discharge: HOME | End: 2019-04-16
Payer: MEDICARE

## 2019-04-16 ENCOUNTER — HOSPITAL ENCOUNTER (OUTPATIENT)
Dept: ULTRASOUND IMAGING | Age: 79
Discharge: HOME | End: 2019-04-16
Attending: UROLOGY
Payer: MEDICARE

## 2019-04-16 ENCOUNTER — HOSPITAL ENCOUNTER (OUTPATIENT)
Dept: GENERAL RADIOLOGY | Age: 79
Discharge: HOME | End: 2019-04-16
Attending: UROLOGY
Payer: MEDICARE

## 2019-04-16 DIAGNOSIS — C64.1 RENAL CELL CARCINOMA OF RIGHT KIDNEY (HCC): ICD-10-CM

## 2019-04-16 LAB
ANION GAP SERPL CALC-SCNC: 5 MMOL/L (ref 3–18)
BUN SERPL-MCNC: 21 MG/DL (ref 7–18)
BUN/CREAT SERPL: 12 (ref 12–20)
CALCIUM SERPL-MCNC: 10.1 MG/DL (ref 8.5–10.1)
CHLORIDE SERPL-SCNC: 97 MMOL/L (ref 100–108)
CO2 SERPL-SCNC: 28 MMOL/L (ref 21–32)
CREAT SERPL-MCNC: 1.73 MG/DL (ref 0.6–1.3)
ERYTHROCYTE [DISTWIDTH] IN BLOOD BY AUTOMATED COUNT: 12.5 % (ref 11.6–14.5)
GLUCOSE SERPL-MCNC: 95 MG/DL (ref 74–99)
HCT VFR BLD AUTO: 37.4 % (ref 35–45)
HGB BLD-MCNC: 12.6 G/DL (ref 12–16)
MCH RBC QN AUTO: 32.5 PG (ref 24–34)
MCHC RBC AUTO-ENTMCNC: 33.7 G/DL (ref 31–37)
MCV RBC AUTO: 96.4 FL (ref 74–97)
PLATELET # BLD AUTO: 212 K/UL (ref 135–420)
PMV BLD AUTO: 10.3 FL (ref 9.2–11.8)
POTASSIUM SERPL-SCNC: 3.5 MMOL/L (ref 3.5–5.5)
RBC # BLD AUTO: 3.88 M/UL (ref 4.2–5.3)
SODIUM SERPL-SCNC: 130 MMOL/L (ref 136–145)
WBC # BLD AUTO: 8.8 K/UL (ref 4.6–13.2)

## 2019-04-16 PROCEDURE — 85027 COMPLETE CBC AUTOMATED: CPT

## 2019-04-16 PROCEDURE — 71046 X-RAY EXAM CHEST 2 VIEWS: CPT

## 2019-04-16 PROCEDURE — 80048 BASIC METABOLIC PNL TOTAL CA: CPT

## 2019-04-16 PROCEDURE — 36415 COLL VENOUS BLD VENIPUNCTURE: CPT

## 2019-04-16 PROCEDURE — 76770 US EXAM ABDO BACK WALL COMP: CPT

## 2019-05-30 NOTE — ROUTINE PROCESS
Bedside and Verbal shift change report given to [address] Jessica Arango (oncoming nurse) by Leticia Gil   (offgoing nurse). Report included the following information SBAR, Intake/Output, MAR, Recent Results and Cardiac Rhythm Sinus Kassandra Lopez. Called pt relayed results advised pt to set up an appt with Hope Pantera to follow up on the different numbers for Vitamin D

## 2019-10-03 ENCOUNTER — HOSPITAL ENCOUNTER (OUTPATIENT)
Dept: LAB | Age: 79
Discharge: HOME | End: 2019-10-03
Payer: MEDICARE

## 2019-10-03 DIAGNOSIS — N28.89 URETERAL FISTULA: ICD-10-CM

## 2019-10-03 DIAGNOSIS — E78.5 HYPERLIPEMIA: ICD-10-CM

## 2019-10-03 DIAGNOSIS — N25.81 SECONDARY HYPERPARATHYROIDISM OF RENAL ORIGIN (HCC): ICD-10-CM

## 2019-10-03 DIAGNOSIS — E21.3 HYPERPARATHYROIDISM, UNSPECIFIED (HCC): ICD-10-CM

## 2019-10-03 DIAGNOSIS — D64.9 ANEMIA, UNSPECIFIED: ICD-10-CM

## 2019-10-03 DIAGNOSIS — R80.9 PROTEINURIA: ICD-10-CM

## 2019-10-03 DIAGNOSIS — N27.0 UNILATERAL SMALL KIDNEY: ICD-10-CM

## 2019-10-03 DIAGNOSIS — I12.9 MALIGNANT HYPERTENSIVE KIDNEY DISEASE WITH CHRONIC KIDNEY DISEASE STAGE I THROUGH STAGE IV, OR UNSPECIFIED(403.00): ICD-10-CM

## 2019-10-03 LAB
25(OH)D3 SERPL-MCNC: 44.2 NG/ML (ref 30–100)
ALBUMIN SERPL-MCNC: 4.4 G/DL (ref 3.4–5)
ANION GAP SERPL CALC-SCNC: 5 MMOL/L (ref 3–18)
APPEARANCE UR: CLEAR
BACTERIA URNS QL MICRO: ABNORMAL /HPF
BILIRUB UR QL: NEGATIVE
BUN SERPL-MCNC: 20 MG/DL (ref 7–18)
BUN/CREAT SERPL: 11 (ref 12–20)
CALCIUM SERPL-MCNC: 10.3 MG/DL (ref 8.5–10.1)
CALCIUM SERPL-MCNC: 9.9 MG/DL (ref 8.5–10.1)
CHLORIDE SERPL-SCNC: 101 MMOL/L (ref 100–111)
CO2 SERPL-SCNC: 30 MMOL/L (ref 21–32)
COLOR UR: YELLOW
CREAT SERPL-MCNC: 1.81 MG/DL (ref 0.6–1.3)
EPITH CASTS URNS QL MICRO: ABNORMAL /LPF (ref 0–5)
GLUCOSE SERPL-MCNC: 101 MG/DL (ref 74–99)
GLUCOSE UR STRIP.AUTO-MCNC: NEGATIVE MG/DL
HCT VFR BLD AUTO: 36.6 % (ref 35–45)
HGB BLD-MCNC: 12.2 G/DL (ref 12–16)
HGB UR QL STRIP: NEGATIVE
KETONES UR QL STRIP.AUTO: NEGATIVE MG/DL
LEUKOCYTE ESTERASE UR QL STRIP.AUTO: ABNORMAL
MAGNESIUM SERPL-MCNC: 2.5 MG/DL (ref 1.6–2.6)
NITRITE UR QL STRIP.AUTO: NEGATIVE
PH UR STRIP: 7 [PH] (ref 5–8)
PHOSPHATE SERPL-MCNC: 2.8 MG/DL (ref 2.5–4.9)
POTASSIUM SERPL-SCNC: 3.5 MMOL/L (ref 3.5–5.5)
PROT UR STRIP-MCNC: NEGATIVE MG/DL
PTH-INTACT SERPL-MCNC: 104.1 PG/ML (ref 18.4–88)
RBC #/AREA URNS HPF: ABNORMAL /HPF (ref 0–5)
SODIUM SERPL-SCNC: 136 MMOL/L (ref 136–145)
SP GR UR REFRACTOMETRY: 1.01 (ref 1–1.03)
UROBILINOGEN UR QL STRIP.AUTO: 0.2 EU/DL (ref 0.2–1)
WBC URNS QL MICRO: ABNORMAL /HPF (ref 0–4)

## 2019-10-03 PROCEDURE — 83735 ASSAY OF MAGNESIUM: CPT

## 2019-10-03 PROCEDURE — 80069 RENAL FUNCTION PANEL: CPT

## 2019-10-03 PROCEDURE — 83970 ASSAY OF PARATHORMONE: CPT

## 2019-10-03 PROCEDURE — 82306 VITAMIN D 25 HYDROXY: CPT

## 2019-10-03 PROCEDURE — 85018 HEMOGLOBIN: CPT

## 2019-10-03 PROCEDURE — 36415 COLL VENOUS BLD VENIPUNCTURE: CPT

## 2019-10-03 PROCEDURE — 81001 URINALYSIS AUTO W/SCOPE: CPT

## 2020-07-02 ENCOUNTER — HOSPITAL ENCOUNTER (OUTPATIENT)
Dept: LAB | Age: 80
Discharge: HOME | End: 2020-07-02
Payer: MEDICARE

## 2020-07-02 LAB
25(OH)D3 SERPL-MCNC: 42.7 NG/ML (ref 30–100)
ALBUMIN SERPL-MCNC: 4.4 G/DL (ref 3.4–5)
ANION GAP SERPL CALC-SCNC: 8 MMOL/L (ref 3–18)
BUN SERPL-MCNC: 20 MG/DL (ref 7–18)
BUN/CREAT SERPL: 12 (ref 12–20)
CALCIUM SERPL-MCNC: 10.3 MG/DL (ref 8.5–10.1)
CALCIUM SERPL-MCNC: 10.5 MG/DL (ref 8.5–10.1)
CHLORIDE SERPL-SCNC: 103 MMOL/L (ref 100–111)
CO2 SERPL-SCNC: 28 MMOL/L (ref 21–32)
CREAT SERPL-MCNC: 1.68 MG/DL (ref 0.6–1.3)
CREAT UR-MCNC: 74 MG/DL (ref 30–125)
GLUCOSE SERPL-MCNC: 94 MG/DL (ref 74–99)
HCT VFR BLD AUTO: 38 % (ref 35–45)
HGB BLD-MCNC: 12.5 G/DL (ref 12–16)
MICROALBUMIN UR-MCNC: 2.56 MG/DL (ref 0–3)
MICROALBUMIN/CREAT UR-RTO: 35 MG/G (ref 0–30)
PHOSPHATE SERPL-MCNC: 3.5 MG/DL (ref 2.5–4.9)
POTASSIUM SERPL-SCNC: 3.3 MMOL/L (ref 3.5–5.5)
PTH-INTACT SERPL-MCNC: 85.8 PG/ML (ref 18.4–88)
SODIUM SERPL-SCNC: 139 MMOL/L (ref 136–145)

## 2020-07-02 PROCEDURE — 83970 ASSAY OF PARATHORMONE: CPT

## 2020-07-02 PROCEDURE — 36415 COLL VENOUS BLD VENIPUNCTURE: CPT

## 2020-07-02 PROCEDURE — 85018 HEMOGLOBIN: CPT

## 2020-07-02 PROCEDURE — 80069 RENAL FUNCTION PANEL: CPT

## 2020-07-02 PROCEDURE — 82306 VITAMIN D 25 HYDROXY: CPT

## 2020-07-02 PROCEDURE — 82043 UR ALBUMIN QUANTITATIVE: CPT

## 2021-02-05 ENCOUNTER — HOSPITAL ENCOUNTER (OUTPATIENT)
Dept: LAB | Age: 81
Discharge: HOME | End: 2021-02-05
Payer: MEDICARE

## 2021-02-05 LAB
ALBUMIN SERPL-MCNC: 4.3 G/DL (ref 3.4–5)
ANION GAP SERPL CALC-SCNC: 8 MMOL/L (ref 3–18)
BUN SERPL-MCNC: 22 MG/DL (ref 7–18)
BUN/CREAT SERPL: 13 (ref 12–20)
CALCIUM SERPL-MCNC: 10 MG/DL (ref 8.5–10.1)
CALCIUM SERPL-MCNC: 10.3 MG/DL (ref 8.5–10.1)
CHLORIDE SERPL-SCNC: 104 MMOL/L (ref 100–111)
CO2 SERPL-SCNC: 31 MMOL/L (ref 21–32)
CREAT SERPL-MCNC: 1.73 MG/DL (ref 0.6–1.3)
CREAT UR-MCNC: 90 MG/DL (ref 30–125)
ERYTHROCYTE [DISTWIDTH] IN BLOOD BY AUTOMATED COUNT: 12.8 % (ref 11.6–14.5)
FERRITIN SERPL-MCNC: 73 NG/ML (ref 8–388)
FOLATE SERPL-MCNC: 8.6 NG/ML (ref 3.1–17.5)
GLUCOSE SERPL-MCNC: 96 MG/DL (ref 74–99)
HCT VFR BLD AUTO: 37.6 % (ref 35–45)
HGB BLD-MCNC: 12.3 G/DL (ref 12–16)
IRON SATN MFR SERPL: 22 % (ref 20–50)
IRON SERPL-MCNC: 80 UG/DL (ref 50–175)
MCH RBC QN AUTO: 33.4 PG (ref 24–34)
MCHC RBC AUTO-ENTMCNC: 32.7 G/DL (ref 31–37)
MCV RBC AUTO: 102.2 FL (ref 74–97)
MICROALBUMIN UR-MCNC: 3.77 MG/DL (ref 0–3)
MICROALBUMIN/CREAT UR-RTO: 42 MG/G (ref 0–30)
PHOSPHATE SERPL-MCNC: 3.6 MG/DL (ref 2.5–4.9)
PLATELET # BLD AUTO: 220 K/UL (ref 135–420)
PMV BLD AUTO: 10.6 FL (ref 9.2–11.8)
POTASSIUM SERPL-SCNC: 3.8 MMOL/L (ref 3.5–5.5)
PTH-INTACT SERPL-MCNC: 108.5 PG/ML (ref 18.4–88)
RBC # BLD AUTO: 3.68 M/UL (ref 4.2–5.3)
SODIUM SERPL-SCNC: 143 MMOL/L (ref 136–145)
TIBC SERPL-MCNC: 365 UG/DL (ref 250–450)
VIT B12 SERPL-MCNC: 326 PG/ML (ref 211–911)
WBC # BLD AUTO: 7.3 K/UL (ref 4.6–13.2)

## 2021-02-05 PROCEDURE — 83970 ASSAY OF PARATHORMONE: CPT

## 2021-02-05 PROCEDURE — 82728 ASSAY OF FERRITIN: CPT

## 2021-02-05 PROCEDURE — 36415 COLL VENOUS BLD VENIPUNCTURE: CPT

## 2021-02-05 PROCEDURE — 85027 COMPLETE CBC AUTOMATED: CPT

## 2021-02-05 PROCEDURE — 82607 VITAMIN B-12: CPT

## 2021-02-05 PROCEDURE — 82043 UR ALBUMIN QUANTITATIVE: CPT

## 2021-02-05 PROCEDURE — 80069 RENAL FUNCTION PANEL: CPT

## 2021-02-05 PROCEDURE — 83540 ASSAY OF IRON: CPT

## 2021-05-14 ENCOUNTER — TRANSCRIBE ORDER (OUTPATIENT)
Dept: SCHEDULING | Age: 81
End: 2021-05-14

## 2021-05-14 DIAGNOSIS — M81.0 OSTEOPOROSIS: Primary | ICD-10-CM

## 2021-05-26 ENCOUNTER — HOSPITAL ENCOUNTER (OUTPATIENT)
Dept: BONE DENSITY | Age: 81
Discharge: HOME | End: 2021-05-26
Attending: INTERNAL MEDICINE
Payer: MEDICARE

## 2021-05-26 DIAGNOSIS — M81.0 OSTEOPOROSIS: ICD-10-CM

## 2021-05-26 PROCEDURE — 77080 DXA BONE DENSITY AXIAL: CPT

## 2021-07-23 ENCOUNTER — TRANSCRIBE ORDER (OUTPATIENT)
Dept: SCHEDULING | Age: 81
End: 2021-07-23

## 2021-07-23 DIAGNOSIS — Z12.31 VISIT FOR SCREENING MAMMOGRAM: Primary | ICD-10-CM

## 2021-08-03 PROBLEM — R55 SYNCOPE: Status: RESOLVED | Noted: 2018-03-07 | Resolved: 2021-08-03

## 2021-08-10 ENCOUNTER — HOSPITAL ENCOUNTER (OUTPATIENT)
Dept: MAMMOGRAPHY | Age: 81
Discharge: HOME | End: 2021-08-10
Attending: INTERNAL MEDICINE
Payer: MEDICARE

## 2021-08-10 DIAGNOSIS — Z12.31 VISIT FOR SCREENING MAMMOGRAM: ICD-10-CM

## 2021-08-10 PROCEDURE — 77063 BREAST TOMOSYNTHESIS BI: CPT

## 2021-12-20 ENCOUNTER — TRANSCRIBE ORDER (OUTPATIENT)
Dept: SCHEDULING | Age: 81
End: 2021-12-20

## 2021-12-20 DIAGNOSIS — C64.1 MALIGNANT NEOPLASM OF RIGHT KIDNEY, EXCEPT RENAL PELVIS (HCC): Primary | ICD-10-CM

## 2021-12-29 ENCOUNTER — HOSPITAL ENCOUNTER (OUTPATIENT)
Dept: CT IMAGING | Age: 81
Discharge: HOME | End: 2021-12-29
Attending: UROLOGY
Payer: MEDICARE

## 2021-12-29 ENCOUNTER — HOSPITAL ENCOUNTER (OUTPATIENT)
Dept: LAB | Age: 81
Discharge: HOME | End: 2021-12-29
Payer: MEDICARE

## 2021-12-29 ENCOUNTER — HOSPITAL ENCOUNTER (OUTPATIENT)
Dept: GENERAL RADIOLOGY | Age: 81
Discharge: HOME | End: 2021-12-29
Attending: UROLOGY
Payer: MEDICARE

## 2021-12-29 LAB
ANION GAP SERPL CALC-SCNC: 7 MMOL/L (ref 3–18)
BUN SERPL-MCNC: 22 MG/DL (ref 7–18)
BUN/CREAT SERPL: 14 (ref 12–20)
CALCIUM SERPL-MCNC: 10.1 MG/DL (ref 8.5–10.1)
CHLORIDE SERPL-SCNC: 104 MMOL/L (ref 100–111)
CO2 SERPL-SCNC: 28 MMOL/L (ref 21–32)
CREAT SERPL-MCNC: 1.55 MG/DL (ref 0.6–1.3)
ERYTHROCYTE [DISTWIDTH] IN BLOOD BY AUTOMATED COUNT: 13 % (ref 11.6–14.5)
GLUCOSE SERPL-MCNC: 101 MG/DL (ref 74–99)
HCT VFR BLD AUTO: 34.2 % (ref 35–45)
HGB BLD-MCNC: 10.9 G/DL (ref 12–16)
MCH RBC QN AUTO: 32.7 PG (ref 24–34)
MCHC RBC AUTO-ENTMCNC: 31.9 G/DL (ref 31–37)
MCV RBC AUTO: 102.7 FL (ref 78–100)
NRBC # BLD: 0 K/UL (ref 0–0.01)
NRBC BLD-RTO: 0 PER 100 WBC
PLATELET # BLD AUTO: 185 K/UL (ref 135–420)
PMV BLD AUTO: 9.9 FL (ref 9.2–11.8)
POTASSIUM SERPL-SCNC: 3.6 MMOL/L (ref 3.5–5.5)
RBC # BLD AUTO: 3.33 M/UL (ref 4.2–5.3)
SODIUM SERPL-SCNC: 139 MMOL/L (ref 136–145)
WBC # BLD AUTO: 6.5 K/UL (ref 4.6–13.2)

## 2021-12-29 PROCEDURE — 36415 COLL VENOUS BLD VENIPUNCTURE: CPT

## 2021-12-29 PROCEDURE — 80048 BASIC METABOLIC PNL TOTAL CA: CPT

## 2021-12-29 PROCEDURE — 71046 X-RAY EXAM CHEST 2 VIEWS: CPT

## 2021-12-29 PROCEDURE — 85027 COMPLETE CBC AUTOMATED: CPT

## 2021-12-29 PROCEDURE — 74176 CT ABD & PELVIS W/O CONTRAST: CPT

## 2022-01-13 PROBLEM — G62.9 POLYNEUROPATHY: Status: ACTIVE | Noted: 2022-01-13

## 2022-01-13 PROBLEM — I62.00 INTRACRANIAL SUBDURAL HEMORRHAGE (HCC): Status: ACTIVE | Noted: 2020-02-25

## 2022-01-13 PROBLEM — T14.90XA TRAUMA: Status: ACTIVE | Noted: 2020-02-18

## 2022-01-13 PROBLEM — S06.33AA CONTUSION OF CEREBRUM: Status: ACTIVE | Noted: 2020-02-25

## 2022-01-13 PROBLEM — E83.52 HYPERCALCEMIA: Status: ACTIVE | Noted: 2019-10-10

## 2022-01-13 PROBLEM — I65.23 OCCLUSION AND STENOSIS OF BILATERAL CAROTID ARTERIES: Status: ACTIVE | Noted: 2022-01-13

## 2022-01-13 PROBLEM — K63.5 POLYP OF COLON: Status: ACTIVE | Noted: 2022-01-13

## 2022-01-13 PROBLEM — S01.01XA SCALP LACERATION: Status: ACTIVE | Noted: 2020-02-25

## 2022-01-13 PROBLEM — E55.9 VITAMIN D DEFICIENCY: Status: ACTIVE | Noted: 2019-10-10

## 2022-01-13 PROBLEM — I60.9 SAH (SUBARACHNOID HEMORRHAGE) (HCC): Status: ACTIVE | Noted: 2020-02-25

## 2022-01-13 PROBLEM — N25.81 SECONDARY HYPERPARATHYROIDISM OF RENAL ORIGIN (HCC): Status: ACTIVE | Noted: 2019-10-10

## 2022-01-13 PROBLEM — S02.0XXA CLOSED FRACTURE OF PARIETAL BONE (HCC): Status: ACTIVE | Noted: 2020-02-25

## 2022-01-13 PROBLEM — M81.0 AGE RELATED OSTEOPOROSIS: Status: ACTIVE | Noted: 2022-01-13

## 2022-01-13 PROBLEM — D64.9 ANEMIA: Status: ACTIVE | Noted: 2022-01-13

## 2022-01-13 PROBLEM — R07.9 CHEST PAIN: Status: ACTIVE | Noted: 2022-01-13

## 2022-01-13 PROBLEM — F32.9 MAJOR DEPRESSION, SINGLE EPISODE: Status: ACTIVE | Noted: 2022-01-13

## 2022-01-13 PROBLEM — R80.9 MICROALBUMINURIA: Status: ACTIVE | Noted: 2019-10-10

## 2022-01-13 PROBLEM — C64.9 PRIMARY MALIGNANT NEOPLASM OF KIDNEY (HCC): Status: ACTIVE | Noted: 2022-01-13

## 2022-01-13 PROBLEM — C50.919 MALIGNANT NEOPLASM OF FEMALE BREAST (HCC): Status: ACTIVE | Noted: 2022-01-13

## 2022-01-13 PROBLEM — M19.90 IDIOPATHIC OSTEOARTHRITIS: Status: ACTIVE | Noted: 2022-01-13

## 2022-01-18 ENCOUNTER — HOSPITAL ENCOUNTER (OUTPATIENT)
Dept: LAB | Age: 82
Discharge: HOME | End: 2022-01-18
Payer: MEDICARE

## 2022-01-18 LAB
25(OH)D3 SERPL-MCNC: 20.5 NG/ML (ref 30–100)
ALBUMIN SERPL-MCNC: 4 G/DL (ref 3.4–5)
ANION GAP SERPL CALC-SCNC: 6 MMOL/L (ref 3–18)
APPEARANCE UR: CLEAR
BACTERIA URNS QL MICRO: ABNORMAL /HPF
BILIRUB UR QL: NEGATIVE
BUN SERPL-MCNC: 21 MG/DL (ref 7–18)
BUN/CREAT SERPL: 13 (ref 12–20)
CALCIUM SERPL-MCNC: 10.1 MG/DL (ref 8.5–10.1)
CALCIUM SERPL-MCNC: 10.4 MG/DL (ref 8.5–10.1)
CHLORIDE SERPL-SCNC: 105 MMOL/L (ref 100–111)
CO2 SERPL-SCNC: 28 MMOL/L (ref 21–32)
COLOR UR: YELLOW
CREAT SERPL-MCNC: 1.58 MG/DL (ref 0.6–1.3)
CREAT UR-MCNC: 122 MG/DL (ref 30–125)
EPITH CASTS URNS QL MICRO: ABNORMAL /LPF (ref 0–5)
ERYTHROCYTE [DISTWIDTH] IN BLOOD BY AUTOMATED COUNT: 12.9 % (ref 11.6–14.5)
GLUCOSE SERPL-MCNC: 88 MG/DL (ref 74–99)
GLUCOSE UR STRIP.AUTO-MCNC: NEGATIVE MG/DL
HCT VFR BLD AUTO: 36 % (ref 35–45)
HGB BLD-MCNC: 11.1 G/DL (ref 12–16)
HGB UR QL STRIP: NEGATIVE
KETONES UR QL STRIP.AUTO: NEGATIVE MG/DL
LEUKOCYTE ESTERASE UR QL STRIP.AUTO: ABNORMAL
MCH RBC QN AUTO: 32.9 PG (ref 24–34)
MCHC RBC AUTO-ENTMCNC: 30.8 G/DL (ref 31–37)
MCV RBC AUTO: 106.8 FL (ref 78–100)
MICROALBUMIN UR-MCNC: 7.78 MG/DL (ref 0–3)
MICROALBUMIN/CREAT UR-RTO: 64 MG/G (ref 0–30)
NITRITE UR QL STRIP.AUTO: NEGATIVE
NRBC # BLD: 0 K/UL (ref 0–0.01)
NRBC BLD-RTO: 0 PER 100 WBC
PH UR STRIP: 5 [PH] (ref 5–8)
PHOSPHATE SERPL-MCNC: 4.2 MG/DL (ref 2.5–4.9)
PLATELET # BLD AUTO: 202 K/UL (ref 135–420)
PMV BLD AUTO: 10.5 FL (ref 9.2–11.8)
POTASSIUM SERPL-SCNC: 4.3 MMOL/L (ref 3.5–5.5)
PROT UR STRIP-MCNC: NEGATIVE MG/DL
PTH-INTACT SERPL-MCNC: 105.7 PG/ML (ref 18.4–88)
RBC # BLD AUTO: 3.37 M/UL (ref 4.2–5.3)
RBC #/AREA URNS HPF: ABNORMAL /HPF (ref 0–5)
SODIUM SERPL-SCNC: 139 MMOL/L (ref 136–145)
SP GR UR REFRACTOMETRY: 1.02 (ref 1–1.03)
UROBILINOGEN UR QL STRIP.AUTO: 0.2 EU/DL (ref 0.2–1)
WBC # BLD AUTO: 8.2 K/UL (ref 4.6–13.2)
WBC URNS QL MICRO: ABNORMAL /HPF (ref 0–4)

## 2022-01-18 PROCEDURE — 82043 UR ALBUMIN QUANTITATIVE: CPT

## 2022-01-18 PROCEDURE — 36415 COLL VENOUS BLD VENIPUNCTURE: CPT

## 2022-01-18 PROCEDURE — 80069 RENAL FUNCTION PANEL: CPT

## 2022-01-18 PROCEDURE — 83970 ASSAY OF PARATHORMONE: CPT

## 2022-01-18 PROCEDURE — 81001 URINALYSIS AUTO W/SCOPE: CPT

## 2022-01-18 PROCEDURE — 85027 COMPLETE CBC AUTOMATED: CPT

## 2022-01-18 PROCEDURE — 82306 VITAMIN D 25 HYDROXY: CPT

## 2022-03-18 PROBLEM — M81.0 AGE RELATED OSTEOPOROSIS: Status: ACTIVE | Noted: 2022-01-13

## 2022-03-18 PROBLEM — M79.7 FIBROMYALGIA: Status: ACTIVE | Noted: 2018-03-07

## 2022-03-18 PROBLEM — R11.2 NAUSEA & VOMITING: Status: ACTIVE | Noted: 2018-03-07

## 2022-03-18 PROBLEM — G62.9 POLYNEUROPATHY: Status: ACTIVE | Noted: 2022-01-13

## 2022-03-18 PROBLEM — D64.9 ANEMIA: Status: ACTIVE | Noted: 2022-01-13

## 2022-03-18 PROBLEM — E55.9 VITAMIN D DEFICIENCY: Status: ACTIVE | Noted: 2019-10-10

## 2022-03-18 PROBLEM — R55 SYNCOPE AND COLLAPSE: Status: ACTIVE | Noted: 2018-03-07

## 2022-03-18 PROBLEM — S06.33AA CONTUSION OF CEREBRUM (HCC): Status: ACTIVE | Noted: 2020-02-25

## 2022-03-18 PROBLEM — F32.9 MAJOR DEPRESSION, SINGLE EPISODE: Status: ACTIVE | Noted: 2022-01-13

## 2022-03-19 PROBLEM — I83.93 VARICOSE VEINS OF BOTH LOWER EXTREMITIES: Status: ACTIVE | Noted: 2018-08-29

## 2022-03-19 PROBLEM — R00.1 BRADYCARDIA: Status: ACTIVE | Noted: 2018-03-07

## 2022-03-19 PROBLEM — R80.9 MICROALBUMINURIA: Status: ACTIVE | Noted: 2019-10-10

## 2022-03-19 PROBLEM — M19.90 ARTHRITIS: Status: ACTIVE | Noted: 2018-03-07

## 2022-03-19 PROBLEM — C80.1 CANCER (HCC): Status: ACTIVE | Noted: 2018-03-07

## 2022-03-19 PROBLEM — M19.90 IDIOPATHIC OSTEOARTHRITIS: Status: ACTIVE | Noted: 2022-01-13

## 2022-03-19 PROBLEM — S01.01XA SCALP LACERATION: Status: ACTIVE | Noted: 2020-02-25

## 2022-03-19 PROBLEM — S02.0XXA CLOSED FRACTURE OF PARIETAL BONE (HCC): Status: ACTIVE | Noted: 2020-02-25

## 2022-03-19 PROBLEM — I65.23 OCCLUSION AND STENOSIS OF BILATERAL CAROTID ARTERIES: Status: ACTIVE | Noted: 2022-01-13

## 2022-03-19 PROBLEM — R10.30 GROIN PAIN: Status: ACTIVE | Noted: 2018-03-07

## 2022-03-19 PROBLEM — K63.5 POLYP OF COLON: Status: ACTIVE | Noted: 2022-01-13

## 2022-03-19 PROBLEM — C64.9 PRIMARY MALIGNANT NEOPLASM OF KIDNEY (HCC): Status: ACTIVE | Noted: 2022-01-13

## 2022-03-19 PROBLEM — M54.9 BACK PAIN: Status: ACTIVE | Noted: 2018-03-07

## 2022-03-19 PROBLEM — R00.1 SINUS BRADYCARDIA: Status: ACTIVE | Noted: 2018-03-07

## 2022-03-19 PROBLEM — I62.00 INTRACRANIAL SUBDURAL HEMORRHAGE (HCC): Status: ACTIVE | Noted: 2020-02-25

## 2022-03-19 PROBLEM — I73.9 PAD (PERIPHERAL ARTERY DISEASE) (HCC): Status: ACTIVE | Noted: 2018-08-29

## 2022-03-19 PROBLEM — I60.9 SAH (SUBARACHNOID HEMORRHAGE) (HCC): Status: ACTIVE | Noted: 2020-02-25

## 2022-03-19 PROBLEM — C50.919 MALIGNANT NEOPLASM OF FEMALE BREAST (HCC): Status: ACTIVE | Noted: 2022-01-13

## 2022-03-19 PROBLEM — M70.60 BURSITIS, TROCHANTERIC: Status: ACTIVE | Noted: 2018-03-07

## 2022-03-19 PROBLEM — T14.90XA TRAUMA: Status: ACTIVE | Noted: 2020-02-18

## 2022-03-19 PROBLEM — M53.3 SI (SACROILIAC) JOINT DYSFUNCTION: Status: ACTIVE | Noted: 2018-03-07

## 2022-03-20 PROBLEM — E78.5 HYPERLIPIDEMIA: Status: ACTIVE | Noted: 2018-03-07

## 2022-03-20 PROBLEM — I87.303 CHRONIC VENOUS HYPERTENSION INVOLVING BOTH SIDES: Status: ACTIVE | Noted: 2018-08-29

## 2022-03-20 PROBLEM — N25.81 SECONDARY HYPERPARATHYROIDISM OF RENAL ORIGIN (HCC): Status: ACTIVE | Noted: 2019-10-10

## 2022-03-20 PROBLEM — I65.23 CAROTID STENOSIS, ASYMPTOMATIC, BILATERAL: Status: ACTIVE | Noted: 2018-08-29

## 2022-03-20 PROBLEM — R07.9 CHEST PAIN: Status: ACTIVE | Noted: 2022-01-13

## 2022-03-20 PROBLEM — E83.52 HYPERCALCEMIA: Status: ACTIVE | Noted: 2019-10-10

## 2022-03-20 PROBLEM — I87.2 CHRONIC VENOUS INSUFFICIENCY: Status: ACTIVE | Noted: 2018-08-29

## 2022-09-13 ENCOUNTER — HOSPITAL ENCOUNTER (OUTPATIENT)
Dept: LAB | Age: 82
Discharge: HOME | End: 2022-09-13
Payer: MEDICARE

## 2022-09-13 LAB
25(OH)D3 SERPL-MCNC: 19.4 NG/ML (ref 30–100)
ALBUMIN SERPL-MCNC: 4.3 G/DL (ref 3.4–5)
ANION GAP SERPL CALC-SCNC: 5 MMOL/L (ref 3–18)
APPEARANCE UR: CLEAR
BACTERIA URNS QL MICRO: ABNORMAL /HPF
BILIRUB UR QL: NEGATIVE
BUN SERPL-MCNC: 23 MG/DL (ref 7–18)
BUN/CREAT SERPL: 15 (ref 12–20)
CALCIUM SERPL-MCNC: 10 MG/DL (ref 8.5–10.1)
CALCIUM SERPL-MCNC: 10.2 MG/DL (ref 8.5–10.1)
CHLORIDE SERPL-SCNC: 106 MMOL/L (ref 100–111)
CO2 SERPL-SCNC: 29 MMOL/L (ref 21–32)
COLOR UR: YELLOW
CREAT SERPL-MCNC: 1.51 MG/DL (ref 0.6–1.3)
CREAT UR-MCNC: 86 MG/DL (ref 30–125)
EPITH CASTS URNS QL MICRO: ABNORMAL /LPF (ref 0–5)
GLUCOSE SERPL-MCNC: 99 MG/DL (ref 74–99)
GLUCOSE UR STRIP.AUTO-MCNC: NEGATIVE MG/DL
HGB UR QL STRIP: NEGATIVE
KETONES UR QL STRIP.AUTO: NEGATIVE MG/DL
LEUKOCYTE ESTERASE UR QL STRIP.AUTO: ABNORMAL
MICROALBUMIN UR-MCNC: 20.8 MG/DL (ref 0–3)
MICROALBUMIN/CREAT UR-RTO: 242 MG/G (ref 0–30)
NITRITE UR QL STRIP.AUTO: NEGATIVE
PH UR STRIP: 6 [PH] (ref 5–8)
PHOSPHATE SERPL-MCNC: 4.2 MG/DL (ref 2.5–4.9)
PHOSPHATE SERPL-MCNC: 4.4 MG/DL (ref 2.5–4.9)
POTASSIUM SERPL-SCNC: 4.6 MMOL/L (ref 3.5–5.5)
PROT UR STRIP-MCNC: 30 MG/DL
PTH-INTACT SERPL-MCNC: 124.6 PG/ML (ref 18.4–88)
RBC #/AREA URNS HPF: ABNORMAL /HPF (ref 0–5)
SODIUM SERPL-SCNC: 140 MMOL/L (ref 136–145)
SP GR UR REFRACTOMETRY: 1.01 (ref 1–1.03)
UROBILINOGEN UR QL STRIP.AUTO: 0.2 EU/DL (ref 0.2–1)
WBC URNS QL MICRO: ABNORMAL /HPF (ref 0–4)

## 2022-09-13 PROCEDURE — 84100 ASSAY OF PHOSPHORUS: CPT

## 2022-09-13 PROCEDURE — 83970 ASSAY OF PARATHORMONE: CPT

## 2022-09-13 PROCEDURE — 36415 COLL VENOUS BLD VENIPUNCTURE: CPT

## 2022-09-13 PROCEDURE — 81001 URINALYSIS AUTO W/SCOPE: CPT

## 2022-09-13 PROCEDURE — 82043 UR ALBUMIN QUANTITATIVE: CPT

## 2022-09-13 PROCEDURE — 82306 VITAMIN D 25 HYDROXY: CPT

## 2022-09-13 PROCEDURE — 80069 RENAL FUNCTION PANEL: CPT

## 2023-03-21 ENCOUNTER — HOSPITAL ENCOUNTER (OUTPATIENT)
Facility: HOSPITAL | Age: 83
Discharge: HOME | End: 2023-03-24
Payer: MEDICARE

## 2023-03-21 LAB
ALBUMIN SERPL-MCNC: 4.3 G/DL (ref 3.4–5)
ANION GAP SERPL CALC-SCNC: 4 MMOL/L (ref 3–18)
BUN SERPL-MCNC: 17 MG/DL (ref 7–18)
BUN/CREAT SERPL: 11 (ref 12–20)
CALCIUM SERPL-MCNC: 10.4 MG/DL (ref 8.5–10.1)
CALCIUM SERPL-MCNC: 9.7 MG/DL (ref 8.5–10.1)
CHLORIDE SERPL-SCNC: 107 MMOL/L (ref 100–111)
CO2 SERPL-SCNC: 29 MMOL/L (ref 21–32)
CREAT SERPL-MCNC: 1.51 MG/DL (ref 0.6–1.3)
CREAT UR-MCNC: 206 MG/DL (ref 30–125)
ERYTHROCYTE [DISTWIDTH] IN BLOOD BY AUTOMATED COUNT: 12.5 % (ref 11.6–14.5)
GLUCOSE SERPL-MCNC: 97 MG/DL (ref 74–99)
HCT VFR BLD AUTO: 38.4 % (ref 35–45)
HGB BLD-MCNC: 12.1 G/DL (ref 12–16)
MCH RBC QN AUTO: 33.1 PG (ref 24–34)
MCHC RBC AUTO-ENTMCNC: 31.5 G/DL (ref 31–37)
MCV RBC AUTO: 104.9 FL (ref 78–100)
NRBC # BLD: 0 K/UL (ref 0–0.01)
NRBC BLD-RTO: 0 PER 100 WBC
PHOSPHATE SERPL-MCNC: 3.6 MG/DL (ref 2.5–4.9)
PLATELET # BLD AUTO: 173 K/UL (ref 135–420)
PMV BLD AUTO: 11.6 FL (ref 9.2–11.8)
POTASSIUM SERPL-SCNC: 4 MMOL/L (ref 3.5–5.5)
PROT UR-MCNC: 78 MG/DL
PROT/CREAT UR-RTO: 0.4
PTH-INTACT SERPL-MCNC: 113.7 PG/ML (ref 18.4–88)
RBC # BLD AUTO: 3.66 M/UL (ref 4.2–5.3)
SODIUM SERPL-SCNC: 140 MMOL/L (ref 136–145)
WBC # BLD AUTO: 5.6 K/UL (ref 4.6–13.2)

## 2023-03-21 PROCEDURE — 83970 ASSAY OF PARATHORMONE: CPT

## 2023-03-21 PROCEDURE — 80069 RENAL FUNCTION PANEL: CPT

## 2023-03-21 PROCEDURE — 36415 COLL VENOUS BLD VENIPUNCTURE: CPT

## 2023-03-21 PROCEDURE — 84156 ASSAY OF PROTEIN URINE: CPT

## 2023-03-21 PROCEDURE — 85027 COMPLETE CBC AUTOMATED: CPT

## 2023-04-05 PROBLEM — S06.33AA CEREBRAL CONTUSION (HCC): Status: ACTIVE | Noted: 2020-02-25

## 2023-04-05 PROBLEM — S06.5XAA SDH (SUBDURAL HEMATOMA) (HCC): Status: ACTIVE | Noted: 2020-02-25

## 2023-04-05 PROBLEM — I66.9 OCCLUSION AND STENOSIS OF UNSPECIFIED CEREBRAL ARTERY: Status: ACTIVE | Noted: 2022-01-25

## 2024-05-07 ENCOUNTER — HOSPITAL ENCOUNTER (OUTPATIENT)
Facility: HOSPITAL | Age: 84
Discharge: HOME | End: 2024-05-10
Payer: MEDICARE

## 2024-05-07 LAB
ALBUMIN SERPL-MCNC: 4.2 G/DL (ref 3.4–5)
ANION GAP SERPL CALC-SCNC: 4 MMOL/L (ref 3–18)
BUN SERPL-MCNC: 13 MG/DL (ref 7–18)
BUN/CREAT SERPL: 9 (ref 12–20)
CALCIUM SERPL-MCNC: 9.5 MG/DL (ref 8.5–10.1)
CALCIUM SERPL-MCNC: 9.8 MG/DL (ref 8.5–10.1)
CHLORIDE SERPL-SCNC: 108 MMOL/L (ref 100–111)
CO2 SERPL-SCNC: 28 MMOL/L (ref 21–32)
CREAT SERPL-MCNC: 1.41 MG/DL (ref 0.6–1.3)
CREAT UR-MCNC: 60 MG/DL (ref 30–125)
ERYTHROCYTE [DISTWIDTH] IN BLOOD BY AUTOMATED COUNT: 12.3 % (ref 11.6–14.5)
GLUCOSE SERPL-MCNC: 102 MG/DL (ref 74–99)
HCT VFR BLD AUTO: 38 % (ref 35–45)
HGB BLD-MCNC: 11.7 G/DL (ref 12–16)
MCH RBC QN AUTO: 33 PG (ref 24–34)
MCHC RBC AUTO-ENTMCNC: 30.8 G/DL (ref 31–37)
MCV RBC AUTO: 107 FL (ref 78–100)
MICROALBUMIN UR-MCNC: 52.2 MG/DL (ref 0–3)
MICROALBUMIN/CREAT UR-RTO: 870 MG/G (ref 0–30)
NRBC # BLD: 0 K/UL (ref 0–0.01)
NRBC BLD-RTO: 0 PER 100 WBC
PHOSPHATE SERPL-MCNC: 3.7 MG/DL (ref 2.5–4.9)
PLATELET # BLD AUTO: 183 K/UL (ref 135–420)
PMV BLD AUTO: 11.2 FL (ref 9.2–11.8)
POTASSIUM SERPL-SCNC: 4.2 MMOL/L (ref 3.5–5.5)
PTH-INTACT SERPL-MCNC: 136.4 PG/ML (ref 18.4–88)
RBC # BLD AUTO: 3.55 M/UL (ref 4.2–5.3)
SODIUM SERPL-SCNC: 140 MMOL/L (ref 136–145)
WBC # BLD AUTO: 7.1 K/UL (ref 4.6–13.2)

## 2024-05-07 PROCEDURE — 82043 UR ALBUMIN QUANTITATIVE: CPT

## 2024-05-07 PROCEDURE — 83970 ASSAY OF PARATHORMONE: CPT

## 2024-05-07 PROCEDURE — 80069 RENAL FUNCTION PANEL: CPT

## 2024-05-07 PROCEDURE — 36415 COLL VENOUS BLD VENIPUNCTURE: CPT

## 2024-05-07 PROCEDURE — 82570 ASSAY OF URINE CREATININE: CPT

## 2024-05-07 PROCEDURE — 85027 COMPLETE CBC AUTOMATED: CPT

## 2024-08-13 RX ORDER — ONDANSETRON 4 MG/1
TABLET, ORALLY DISINTEGRATING ORAL
COMMUNITY
Start: 2024-07-11

## 2024-08-13 RX ORDER — FAMOTIDINE 20 MG/1
20 TABLET, FILM COATED ORAL 2 TIMES DAILY
COMMUNITY

## 2024-08-13 NOTE — PROGRESS NOTES
Pressure/Heart medications.  Bring inhaler.  Bring CPAP machine.    Your Pacemaker/AICD needs to be interrogated within 6 months of your procedure. If not interrogated within this timeframe, your procedure will be canceled.    If you have a history of recreational drug use, you may be required to submit a urine sample for drug testing the day of your procedure, as some recreational drugs can interact with anesthetics and increase your surgical risk.    Any questions regarding prep, please call the office at [phone].    For any questions or concerns on the day of procedure, please call the Endo Suite at [phone].    These surgical instructions were reviewed with the patient during the PAT phone call.

## 2024-08-20 ENCOUNTER — ANESTHESIA EVENT (OUTPATIENT)
Facility: HOSPITAL | Age: 84
End: 2024-08-20
Payer: MEDICARE

## 2024-08-21 ENCOUNTER — ANESTHESIA (OUTPATIENT)
Facility: HOSPITAL | Age: 84
End: 2024-08-21
Payer: MEDICARE

## 2024-08-21 ENCOUNTER — HOSPITAL ENCOUNTER (OUTPATIENT)
Facility: HOSPITAL | Age: 84
Setting detail: OUTPATIENT SURGERY
Discharge: HOME | End: 2024-08-21
Attending: INTERNAL MEDICINE | Admitting: INTERNAL MEDICINE
Payer: MEDICARE

## 2024-08-21 VITALS
OXYGEN SATURATION: 99 % | DIASTOLIC BLOOD PRESSURE: 65 MMHG | HEART RATE: 88 BPM | RESPIRATION RATE: 19 BRPM | WEIGHT: 104.8 LBS | BODY MASS INDEX: 18.56 KG/M2 | TEMPERATURE: 97.3 F | SYSTOLIC BLOOD PRESSURE: 155 MMHG

## 2024-08-21 PROCEDURE — 3700000001 HC ADD 15 MINUTES (ANESTHESIA): Performed by: INTERNAL MEDICINE

## 2024-08-21 PROCEDURE — 3600007502: Performed by: INTERNAL MEDICINE

## 2024-08-21 PROCEDURE — 2500000003 HC RX 250 WO HCPCS: Performed by: STUDENT IN AN ORGANIZED HEALTH CARE EDUCATION/TRAINING PROGRAM

## 2024-08-21 PROCEDURE — 3700000000 HC ANESTHESIA ATTENDED CARE: Performed by: INTERNAL MEDICINE

## 2024-08-21 PROCEDURE — 2709999900 HC NON-CHARGEABLE SUPPLY: Performed by: INTERNAL MEDICINE

## 2024-08-21 PROCEDURE — 2580000003 HC RX 258: Performed by: NURSE ANESTHETIST, CERTIFIED REGISTERED

## 2024-08-21 PROCEDURE — 7100000000 HC PACU RECOVERY - FIRST 15 MIN: Performed by: INTERNAL MEDICINE

## 2024-08-21 PROCEDURE — 7100000010 HC PHASE II RECOVERY - FIRST 15 MIN: Performed by: INTERNAL MEDICINE

## 2024-08-21 PROCEDURE — 6360000002 HC RX W HCPCS: Performed by: STUDENT IN AN ORGANIZED HEALTH CARE EDUCATION/TRAINING PROGRAM

## 2024-08-21 PROCEDURE — 3600007512: Performed by: INTERNAL MEDICINE

## 2024-08-21 RX ORDER — SODIUM CHLORIDE, SODIUM LACTATE, POTASSIUM CHLORIDE, CALCIUM CHLORIDE 600; 310; 30; 20 MG/100ML; MG/100ML; MG/100ML; MG/100ML
INJECTION, SOLUTION INTRAVENOUS CONTINUOUS
Status: DISCONTINUED | OUTPATIENT
Start: 2024-08-21 | End: 2024-08-21 | Stop reason: HOSPADM

## 2024-08-21 RX ORDER — PROPOFOL 10 MG/ML
INJECTION, EMULSION INTRAVENOUS PRN
Status: DISCONTINUED | OUTPATIENT
Start: 2024-08-21 | End: 2024-08-21 | Stop reason: SDUPTHER

## 2024-08-21 RX ORDER — GLYCOPYRROLATE 0.2 MG/ML
INJECTION INTRAMUSCULAR; INTRAVENOUS PRN
Status: DISCONTINUED | OUTPATIENT
Start: 2024-08-21 | End: 2024-08-21 | Stop reason: SDUPTHER

## 2024-08-21 RX ORDER — LIDOCAINE HYDROCHLORIDE 20 MG/ML
INJECTION, SOLUTION EPIDURAL; INFILTRATION; INTRACAUDAL; PERINEURAL PRN
Status: DISCONTINUED | OUTPATIENT
Start: 2024-08-21 | End: 2024-08-21 | Stop reason: SDUPTHER

## 2024-08-21 RX ORDER — FAMOTIDINE 20 MG/1
20 TABLET, FILM COATED ORAL ONCE
Status: DISCONTINUED | OUTPATIENT
Start: 2024-08-21 | End: 2024-08-21 | Stop reason: HOSPADM

## 2024-08-21 RX ORDER — LIDOCAINE HYDROCHLORIDE 10 MG/ML
1 INJECTION, SOLUTION EPIDURAL; INFILTRATION; INTRACAUDAL; PERINEURAL
Status: DISCONTINUED | OUTPATIENT
Start: 2024-08-21 | End: 2024-08-21 | Stop reason: HOSPADM

## 2024-08-21 RX ADMIN — GLYCOPYRROLATE 0.1 MG: 0.2 INJECTION INTRAMUSCULAR; INTRAVENOUS at 07:33

## 2024-08-21 RX ADMIN — LIDOCAINE HYDROCHLORIDE 40 MG: 20 INJECTION, SOLUTION EPIDURAL; INFILTRATION; INTRACAUDAL; PERINEURAL at 07:40

## 2024-08-21 RX ADMIN — PROPOFOL 20 MG: 10 INJECTION, EMULSION INTRAVENOUS at 07:41

## 2024-08-21 RX ADMIN — PROPOFOL 50 MG: 10 INJECTION, EMULSION INTRAVENOUS at 07:40

## 2024-08-21 RX ADMIN — SODIUM CHLORIDE, POTASSIUM CHLORIDE, SODIUM LACTATE AND CALCIUM CHLORIDE: 600; 310; 30; 20 INJECTION, SOLUTION INTRAVENOUS at 07:03

## 2024-08-21 ASSESSMENT — PAIN - FUNCTIONAL ASSESSMENT
PAIN_FUNCTIONAL_ASSESSMENT: NONE - DENIES PAIN
PAIN_FUNCTIONAL_ASSESSMENT: 0-10

## 2024-08-21 NOTE — ANESTHESIA POSTPROCEDURE EVALUATION
Department of Anesthesiology  Postprocedure Note    Patient: Faiza Piper  MRN: [id number]  YOB: 1940  Date of evaluation: 8/21/2024    Procedure Summary       Date: 08/21/24 Room / Location: [facility] ENDO 02 / [facility] ENDOSCOPY    Anesthesia Start: 0730 Anesthesia Stop: 0752    Procedure: ESOPHAGOGASTRODUODENOSCOPY [NORMAL] (Upper GI Region) Diagnosis:       Abdominal pain, epigastric      Loss of appetite      Weight loss      Nausea      BMI less than 19,adult    Surgeons: Faisal Cano MD Responsible Provider: Gigi Aguilera MD    Anesthesia Type: General, TIVA ASA Status: 3            Anesthesia Type: General, TIVA    Valeri Phase I: Valeri Score: 9    Valeri Phase II: Valeri Score: 10    Anesthesia Post Evaluation    Patient location during evaluation: bedside  Patient participation: complete - patient participated  Level of consciousness: awake  Pain score: 0  Airway patency: patent  Nausea & Vomiting: no nausea  Cardiovascular status: hemodynamically stable  Respiratory status: acceptable  Hydration status: euvolemic  Pain management: satisfactory to patient        No notable events documented.

## 2024-08-21 NOTE — ANESTHESIA PRE PROCEDURE
Department of Anesthesiology  Preprocedure Note       Name:  Faiza Piper   Age:  84 y.o.  :  1940                                          MRN:  [id number]         Date:  2024      Surgeon: Surgeon(s):  Faisal Cano MD    Procedure: Procedure(s):  ESOPHAGOGASTRODUODENOSCOPY [NORMAL]    Medications prior to admission:   Prior to Admission medications    Medication Sig Start Date End Date Taking? Authorizing Provider   famotidine (PEPCID) 20 MG tablet Take 1 tablet by mouth 2 times daily   Yes Durga Anderson MD   ondansetron (ZOFRAN-ODT) 4 MG disintegrating tablet DISSOLVE 1 TABLET ON TONGUE EVERY 4 TO 6 HOURS AS NEEDED FOR 10 DAYS 24  Yes Durga Anderson MD   lovastatin (MEVACOR) 10 MG tablet nightly 23  Yes Durga Anderson MD   furosemide (LASIX) 20 MG tablet as needed (swelling in feet) 3/2/23  Yes Durga Anderson MD   acetaminophen (TYLENOL) 325 MG tablet Take by mouth every 4 hours as needed   Yes Automatic Reconciliation, Ar   amLODIPine (NORVASC) 5 MG tablet Take 1 tablet by mouth daily   Yes Automatic Reconciliation, Ar   melatonin 5 MG TABS tablet Take by mouth nightly   Yes Automatic Reconciliation, Ar   vitamin D (ERGOCALCIFEROL) 1.25 MG (87414 UT) CAPS capsule  23   ProviderDurga MD       Current medications:    No current facility-administered medications for this encounter.     Current Outpatient Medications   Medication Sig Dispense Refill   • famotidine (PEPCID) 20 MG tablet Take 1 tablet by mouth 2 times daily     • ondansetron (ZOFRAN-ODT) 4 MG disintegrating tablet DISSOLVE 1 TABLET ON TONGUE EVERY 4 TO 6 HOURS AS NEEDED FOR 10 DAYS     • lovastatin (MEVACOR) 10 MG tablet nightly     • furosemide (LASIX) 20 MG tablet as needed (swelling in feet)     • acetaminophen (TYLENOL) 325 MG tablet Take by mouth every 4 hours as needed     • amLODIPine (NORVASC) 5 MG tablet Take 1 tablet by mouth daily     • melatonin 5 MG TABS tablet Take by

## (undated) DEVICE — CATHETER SUCT TR FL TIP 14FR W/ O CTRL

## (undated) DEVICE — SOLUTION IRRIG 1000ML H2O STRL BLT

## (undated) DEVICE — STERILE POLYISOPRENE POWDER-FREE SURGICAL GLOVES: Brand: PROTEXIS

## (undated) DEVICE — MEDI-VAC NON-CONDUCTIVE SUCTION TUBING: Brand: CARDINAL HEALTH

## (undated) DEVICE — YANKAUER,SMOOTH HANDLE,HIGH CAPACITY: Brand: MEDLINE INDUSTRIES, INC.

## (undated) DEVICE — SYRINGE MED 50ML LUERSLIP TIP

## (undated) DEVICE — LINER SUCT CANSTR 3000CC PLAS SFT PRE ASSEMB W/OUT TBNG W/

## (undated) DEVICE — ENDOSCOPY PUMP TUBING/ CAP SET: Brand: ERBE

## (undated) DEVICE — BITE BLOCK ENDOSCP UNIV AD 6 TO 9.4 MM

## (undated) DEVICE — CANNULA NSL AD TBNG L14FT STD PVC O2 CRV CONN NONFLARED NSL

## (undated) DEVICE — UNDERPAD INCONT W23XL36IN STD BLU POLYPR BK FLUF SFT

## (undated) DEVICE — BASIN EMSIS 16OZ GRAPHITE PLAS KID SHP MOLD GRAD FOR ORAL

## (undated) DEVICE — SYRINGE MED 25GA 3ML L5/8IN SUBQ PLAS W/ DETACH NDL SFTY

## (undated) DEVICE — GAUZE,SPONGE,4"X4",16PLY,STRL,LF,10/TRAY: Brand: MEDLINE